# Patient Record
Sex: FEMALE | Race: WHITE
[De-identification: names, ages, dates, MRNs, and addresses within clinical notes are randomized per-mention and may not be internally consistent; named-entity substitution may affect disease eponyms.]

---

## 2017-02-05 ENCOUNTER — HOSPITAL ENCOUNTER (EMERGENCY)
Dept: HOSPITAL 17 - PHED | Age: 47
Discharge: HOME | End: 2017-02-05
Payer: COMMERCIAL

## 2017-02-05 VITALS
TEMPERATURE: 97.5 F | DIASTOLIC BLOOD PRESSURE: 87 MMHG | SYSTOLIC BLOOD PRESSURE: 133 MMHG | HEART RATE: 79 BPM | RESPIRATION RATE: 16 BRPM | OXYGEN SATURATION: 97 %

## 2017-02-05 VITALS — DIASTOLIC BLOOD PRESSURE: 86 MMHG | SYSTOLIC BLOOD PRESSURE: 132 MMHG

## 2017-02-05 VITALS — HEIGHT: 63 IN | BODY MASS INDEX: 42.7 KG/M2 | WEIGHT: 240.97 LBS

## 2017-02-05 DIAGNOSIS — F17.210: ICD-10-CM

## 2017-02-05 DIAGNOSIS — I10: ICD-10-CM

## 2017-02-05 DIAGNOSIS — L03.113: Primary | ICD-10-CM

## 2017-02-05 PROCEDURE — 96372 THER/PROPH/DIAG INJ SC/IM: CPT

## 2017-02-05 NOTE — PD
HPI


Chief Complaint:  Skin Problem


Time Seen by Provider:  09:26


Travel History


International Travel<30 days:  No


Contact w/Intl Traveler<30days:  No


Traveled to known affect area:  No





History of Present Illness


HPI


This is a 46-year-old female who presents with pain, swelling and redness of 

her right elbow, constant since yesterday morning when she woke up with the 

symptoms, worsening throughout the day and the feeling like a burning 

sensation.  She denies any associated fevers or chills.  She's never had 

anything like this before.  She thought maybe she got bit by something.  Her 

 has a history of MRSA.  She is not on any immunosuppressive medications.





PF[facility]


Past Medical History


Hx Anticoagulant Therapy:  No


Cardiovascular Problems:  Yes (HTN)


Diabetes:  No


Diminished Hearing:  No


Hypertension:  Yes


Neurologic:  Yes (transverse myleitis)


Psychiatric:  Yes (mood disorder )


Tetanus Vaccination:  > 5 Years


Influenza Vaccination:  No


Pregnant?:  Not Pregnant





Past Surgical History


Hysterectomy:  Yes





Social History


Alcohol Use:  No


Tobacco Use:  Yes (1 ppd)


Substance Use:  No





Allergies-Medications


(Allergen,Severity, Reaction):  


Coded Allergies:  


     No Known Allergies (Unverified , 2/5/17)


Reported Meds & Prescriptions





Reported Meds & Active Scripts


Active


Reported


Contrave (Naltrexone-Bupropion) 8-90 Mg Tab 1 Tab PO BID


Lisinopril 10 Mg Tab 10 Mg PO DAILY


Cardizem CD 24 HR (Diltiazem CD 24 HR) 240 Mg Caper 240 Mg PO DAILY


Prilosec (Omeprazole) 20 Mg Cap 20 Mg PO DAILY








Review of Systems


Except as stated in HPI:  all other systems reviewed are Neg





Physical Exam


Narrative


GENERAL:Well appearing, no acute distress


SKIN: 10 x 10 cm area of warmth and erythema with no induration or fluctuance 

overlying the olecranon bursa extending down into the right forearm.


HEAD: Atraumatic. Normocephalic. 


EYES: Pupils equal and round.  No injection or drainage. 


ENT:  Moist mucous membranes


NECK: Trachea midline. 


CARDIOVASCULAR: Regular rate and rhythm.  No murmur appreciated.


RESPIRATORY: Clear to auscultation. Breath sounds equal bilaterally. 


GASTROINTESTINAL: Abdomen soft, non-tender, nondistended. 


MUSCULOSKELETAL: Full painless passive range of motion of the right elbow with 

no joint effusion.  No fluctuance of the olecranon bursa but it is somewhat 

tender.


NEUROLOGICAL: Awake and alert. No obvious cranial nerve deficits.  Moving all 

extremities.


PSYCHIATRIC: Appropriate mood and affect; insight and judgment normal.





Data


Data


Last Documented VS





Vital Signs








  Date Time  Temp Pulse Resp B/P Pulse Ox O2 Delivery O2 Flow Rate FiO2


 


2/5/17 09:17 97.5 79 16 133/87 97   











[facility]


Medical Decision Making


Medical Screen Exam Complete:  Yes


Emergency Medical Condition:  Yes


Interpretation(s)


Afebrile


Differential Diagnosis


Cellulitis, olecranon bursitis, septic arthritis, sepsis


Narrative Course


This is a 46-year-old female who presents to the emergency department with 2 

days of redness and warmth of her right elbow.  She has a 10 x 10 cm area of 

cellulitis overlying the right elbow.  She does have some tenderness of the 

olecranon bursa but no fluctuance and the cellulitis extends down to her 

forearm.  I suspect this is more of a soft tissue infection then an acute 

bursitis.  The area was marked and the patient will be started on antibiotics.  

I don't think she requires labs or further workup at this time and she is 

nontoxic appearing, has full range of motion of the elbow and I don't suspect a 

septic arthritis.  I did advise her to return to the emergency department if 

she develops a fever or if her infection is worsening or spreading rapidly.  

She expressed understanding and is amenable to this plan.





Diagnosis





 Primary Impression:  


 Cellulitis of right elbow


Patient Instructions:  General Instructions





***Additional Instructions:


If you develop fever, increasing redness, warmth, or spreading of your infection

, or severe pain with moving the elbow, return to the emergency department 

immediately as you may require antibiotics through your IV.





Complete your course of antibiotics as prescribed.


***Med/Other Pt SpecificInfo:  Prescription(s) given


Scripts


Tramadol 50 Mg Tab50 Mg PO Q6H PRN (PAIN) #10 TAB


   Prov:Linh Huitron MD         2/5/17 


Clindamycin 300 Mg [id number] Mg PO TID  10 Days


   Prov:Linh Huitron MD         2/5/17


Disposition:  01 DISCHARGE HOME


Condition:  Stable








Linh Huitron MD Feb 5, 2017 09:44

## 2017-07-22 ENCOUNTER — HOSPITAL ENCOUNTER (EMERGENCY)
Dept: HOSPITAL 17 - PHEFT | Age: 47
Discharge: HOME | End: 2017-07-22
Payer: COMMERCIAL

## 2017-07-22 VITALS — BODY MASS INDEX: 41.56 KG/M2 | HEIGHT: 63 IN | WEIGHT: 234.57 LBS

## 2017-07-22 VITALS
RESPIRATION RATE: 18 BRPM | DIASTOLIC BLOOD PRESSURE: 63 MMHG | HEART RATE: 82 BPM | TEMPERATURE: 97.4 F | SYSTOLIC BLOOD PRESSURE: 134 MMHG | OXYGEN SATURATION: 99 %

## 2017-07-22 DIAGNOSIS — I10: ICD-10-CM
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
## 2017-07-22 DIAGNOSIS — S83.92XA: Primary | ICD-10-CM

## 2017-07-22 DIAGNOSIS — F17.210: ICD-10-CM
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
## 2017-07-22 PROCEDURE — 99283 EMERGENCY DEPT VISIT LOW MDM: CPT

## 2017-07-22 PROCEDURE — 73564 X-RAY EXAM KNEE 4 OR MORE: CPT

## 2017-07-22 NOTE — RADRPT
EXAM DATE/TIME:  07/22/2017 21:28 

 

[facility] COMPARISON:     

No previous studies available for comparison.

 

                     

INDICATIONS :     

Left knee pain.

                     

 

MEDICAL HISTORY :            

Transverse Myelitis, Cardiac arrest   

 

SURGICAL HISTORY :     

Hysterectomy.   Cardiac catheterization.

 

ENCOUNTER:     

Initial                                        

 

ACUITY:     

2 days      

 

PAIN SCORE:     

9/10

 

LOCATION:     

Left  knee

 

FINDINGS:     

Four view examination of the left knee demonstrates no evidence of fracture or dislocation.  Bony min
eralization is normal. Mild degenerative changes without fracture.  The suprapatellar soft tissues ha
ve a normal configuration.

 

CONCLUSION:     

Mild osteoarthritis without fracture.

 

 

 

 Saad Barboza MD on July 22, 2017 at 22:00           

Board Certified Radiologist.

 This report was verified electronically.

## 2017-09-02 ENCOUNTER — HOSPITAL ENCOUNTER (INPATIENT)
Dept: HOSPITAL 17 - PHED | Age: 47
LOS: 2 days | Discharge: HOME | DRG: 99 | End: 2017-09-04
Attending: HOSPITALIST | Admitting: HOSPITALIST
Payer: COMMERCIAL

## 2017-09-02 VITALS
HEART RATE: 83 BPM | RESPIRATION RATE: 17 BRPM | OXYGEN SATURATION: 95 % | TEMPERATURE: 97.7 F | SYSTOLIC BLOOD PRESSURE: 137 MMHG | DIASTOLIC BLOOD PRESSURE: 67 MMHG

## 2017-09-02 VITALS — WEIGHT: 247.58 LBS | BODY MASS INDEX: 43.87 KG/M2 | HEIGHT: 63 IN

## 2017-09-02 VITALS
OXYGEN SATURATION: 95 % | DIASTOLIC BLOOD PRESSURE: 57 MMHG | HEART RATE: 64 BPM | RESPIRATION RATE: 16 BRPM | SYSTOLIC BLOOD PRESSURE: 112 MMHG

## 2017-09-02 VITALS
OXYGEN SATURATION: 98 % | DIASTOLIC BLOOD PRESSURE: 82 MMHG | TEMPERATURE: 95.9 F | RESPIRATION RATE: 18 BRPM | HEART RATE: 72 BPM | SYSTOLIC BLOOD PRESSURE: 150 MMHG

## 2017-09-02 DIAGNOSIS — K58.9: ICD-10-CM

## 2017-09-02 DIAGNOSIS — I25.2: ICD-10-CM

## 2017-09-02 DIAGNOSIS — R39.15: ICD-10-CM

## 2017-09-02 DIAGNOSIS — F17.210: ICD-10-CM

## 2017-09-02 DIAGNOSIS — G37.3: Primary | ICD-10-CM

## 2017-09-02 DIAGNOSIS — I10: ICD-10-CM

## 2017-09-02 LAB
ALP SERPL-CCNC: 45 U/L (ref 45–117)
ALT SERPL-CCNC: 15 U/L (ref 10–53)
ANION GAP SERPL CALC-SCNC: 8 MEQ/L (ref 5–15)
APTT BLD: 24.3 SEC (ref 24.3–30.1)
AST SERPL-CCNC: 28 U/L (ref 15–37)
BACTERIA #/AREA URNS HPF: (no result) /HPF
BASOPHILS # BLD AUTO: 0.2 TH/MM3 (ref 0–0.2)
BASOPHILS NFR BLD: 2.3 % (ref 0–2)
BILIRUB SERPL-MCNC: 0.3 MG/DL (ref 0.2–1)
BUN SERPL-MCNC: 17 MG/DL (ref 7–18)
CHLORIDE SERPL-SCNC: 107 MEQ/L (ref 98–107)
COLOR UR: (no result)
COMMENT (UR): (no result)
CULTURE IF INDICATED: (no result)
EOSINOPHIL # BLD: 0 TH/MM3 (ref 0–0.4)
EOSINOPHIL NFR BLD: 0.1 % (ref 0–4)
ERYTHROCYTE [DISTWIDTH] IN BLOOD BY AUTOMATED COUNT: 12.8 % (ref 11.6–17.2)
GFR SERPLBLD BASED ON 1.73 SQ M-ARVRAT: 89 ML/MIN (ref 89–?)
GLUCOSE UR STRIP-MCNC: (no result) MG/DL
HCO3 BLD-SCNC: 24.3 MEQ/L (ref 21–32)
HCT VFR BLD CALC: 36.3 % (ref 35–46)
HEMO FLAGS: (no result)
HGB UR QL STRIP: (no result)
INR PPP: 0.9 RATIO
KETONES UR STRIP-MCNC: (no result) MG/DL
LABORATORY COMMENT REPORT: (no result)
LEUKOCYTE ESTERASE UR QL STRIP: (no result) /HPF (ref 0–5)
LYMPHOCYTES # BLD AUTO: 1.2 TH/MM3 (ref 1–4.8)
LYMPHOCYTES NFR BLD AUTO: 14.5 % (ref 9–44)
MAGNESIUM SERPL-MCNC: 2.1 MG/DL (ref 1.5–2.5)
MCH RBC QN AUTO: 32 PG (ref 27–34)
MCHC RBC AUTO-ENTMCNC: 33.1 % (ref 32–36)
MCV RBC AUTO: 96.6 FL (ref 80–100)
METHOD OF COLLECTION: (no result)
MONOCYTES NFR BLD: 7.5 % (ref 0–8)
NEUTROPHILS # BLD AUTO: 6.5 TH/MM3 (ref 1.8–7.7)
NEUTROPHILS NFR BLD AUTO: 75.6 % (ref 16–70)
NITRITE UR QL STRIP: (no result)
PLATELET # BLD: 195 TH/MM3 (ref 150–450)
POTASSIUM SERPL-SCNC: 4.6 MEQ/L (ref 3.5–5.1)
PROTHROMBIN TIME: 10.3 SEC (ref 9.8–11.6)
RBC # BLD AUTO: 3.76 MIL/MM3 (ref 4–5.3)
RBC #/AREA URNS HPF: (no result) /HPF (ref 0–3)
SODIUM SERPL-SCNC: 139 MEQ/L (ref 136–145)
SP GR UR STRIP: 1.01 (ref 1–1.03)
SQUAMOUS #/AREA URNS HPF: > 8 /HPF (ref 0–5)
WBC # BLD AUTO: 8.5 TH/MM3 (ref 4–11)

## 2017-09-02 PROCEDURE — 51702 INSERT TEMP BLADDER CATH: CPT

## 2017-09-02 PROCEDURE — 83735 ASSAY OF MAGNESIUM: CPT

## 2017-09-02 PROCEDURE — 81001 URINALYSIS AUTO W/SCOPE: CPT

## 2017-09-02 PROCEDURE — 85610 PROTHROMBIN TIME: CPT

## 2017-09-02 PROCEDURE — 85652 RBC SED RATE AUTOMATED: CPT

## 2017-09-02 PROCEDURE — 72156 MRI NECK SPINE W/O & W/DYE: CPT

## 2017-09-02 PROCEDURE — A9579 GAD-BASE MR CONTRAST NOS,1ML: HCPCS

## 2017-09-02 PROCEDURE — 87086 URINE CULTURE/COLONY COUNT: CPT

## 2017-09-02 PROCEDURE — 85730 THROMBOPLASTIN TIME PARTIAL: CPT

## 2017-09-02 PROCEDURE — 87077 CULTURE AEROBIC IDENTIFY: CPT

## 2017-09-02 PROCEDURE — 93005 ELECTROCARDIOGRAM TRACING: CPT

## 2017-09-02 PROCEDURE — 84443 ASSAY THYROID STIM HORMONE: CPT

## 2017-09-02 PROCEDURE — 70553 MRI BRAIN STEM W/O & W/DYE: CPT

## 2017-09-02 PROCEDURE — 87186 SC STD MICRODIL/AGAR DIL: CPT

## 2017-09-02 PROCEDURE — 85025 COMPLETE CBC W/AUTO DIFF WBC: CPT

## 2017-09-02 PROCEDURE — 80053 COMPREHEN METABOLIC PANEL: CPT

## 2017-09-02 RX ADMIN — LISINOPRIL SCH MG: 20 TABLET ORAL at 21:48

## 2017-09-02 RX ADMIN — Medication SCH ML: at 21:00

## 2017-09-02 RX ADMIN — PHENYTOIN SODIUM SCH MLS/HR: 50 INJECTION INTRAMUSCULAR; INTRAVENOUS at 21:48

## 2017-09-02 RX ADMIN — STANDARDIZED SENNA CONCENTRATE AND DOCUSATE SODIUM SCH TAB: 8.6; 5 TABLET, FILM COATED ORAL at 21:48

## 2017-09-02 NOTE — RADRPT
EXAM DATE/TIME:  09/02/2017 18:41 

 

[facility] COMPARISON:     

No previous studies available for comparison.

       

 

 

INDICATIONS :     

Left side weakness.

                     

 

CONTRAST:     

20 cc Omniscan (gadodiamide) IV

                     

 

MEDICAL HISTORY :     

Hypertension. Cardiovascular disease   

 

SURGICAL HISTORY :     

Hysterectomy.     Bladder sling

 

ENCOUNTER:     

Initial

 

ACUITY:     

1 day

 

PAIN SCORE:     

0/10

 

LOCATION:       

cranial 

 

TECHNIQUE:     

Multiplanar, multisequence MRI of the brain was performed both prior to and following the administrat
ion of paramagnetic contrast.

 

FINDINGS:     

 

CEREBRUM:     

The ventricles are normal for age.  No evidence of midline shift, mass lesion, hemorrhage or acute in
farction.  No extraaxial fluid collections are seen.  The pituitary gland and suprasellar cistern are
 normal in configuration.

 

WHITE MATTER:     

On the flair weighted images there are multiple small punctate areas of increased signal in the white
 matter.

 

POSTERIOR FOSSA:     

The cerebellum and brainstem are intact.  The 4th ventricle is midline. The cerebellopontine angle is
 unremarkable.  The cerebellar tonsils are normal in position.

 

DIFFUSION IMAGING:     

No focal areas of restricted diffusion are seen.  No evidence of acute infarction.

 

EXTRACRANIAL:     

The visualized portions of the orbits and paranasal sinuses are unremarkable.

 

POST-CONTRAST:     

No abnormal areas of parenchymal or dural enhancement.  No evidence of blood-brain barrier breakdown.


 

CONCLUSION:     

1. No acute hemorrhage, mass or infarction.

2. On the flair weighted images there are multiple small scattered punctate areas of increased signal
 which are nonspecific. These may represent chronic small vessel ischemic change. Demyelination is le
ss likely.

 

 

 

 Lopez Harding MD on September 02, 2017 at 19:51           

Board Certified Radiologist.

 This report was verified electronically.

## 2017-09-02 NOTE — PD
HPI


Chief Complaint:  General Weakness


Time Seen by Provider:  16:36


Travel History


International Travel<30 days:  No


Contact w/Intl Traveler<30days:  No


Traveled to known affect area:  No





History of Present Illness


HPI


This 47-year-old female is complaining of generalized weakness.  She says that 

about 10 years ago she had transverse myelitis.  She was admitted to the 

hospital and had a fairly good recovery.  At that time her left side was weaker 

than her right.  She was not able to walk.  Last Saturday she started having 

some electric shock sensations in her arm, especially her left arm.  She has 

noted increasing weakness of the arm and the leg.  She went to see her primary 

care doctor on Monday and was started on a Medrol Dosepak.  She was on his on 

the fifth day of the Medrol Dosepak.  She was treated in [facility] at 

that time.  Today she has had some incontinence of urine on 2 occasions.  She 

has not had incontinence before.  Her previous episode was 10 years ago in 

[place].  She was treated with a steroid drip and had a fairly prompt 

improvement.  She did not have a complete recovery and she generally walks with 

a limp.  She has a note with her from her treating the neurologist saying that 

she has a permanent lesion in the cervical spine.  She was not diagnosed with 

multiple sclerosis at that time she does say her face was effected at that time





[facility]


Past Medical History


Hx Anticoagulant Therapy:  No


Cardiac Catheterization:  Yes


Cardiovascular Problems:  Yes (heart attack)


Diabetes:  No


Diminished Hearing:  No


Hypertension:  Yes


Neurologic:  Yes (transverse myleitis)


Psychiatric:  Yes (mood disorder )


Tubal Ligation:  Yes





Past Surgical History


Genitourinary Surgery:  Yes (bladder sling)


Hysterectomy:  Yes





Social History


Alcohol Use:  No


Tobacco Use:  Yes (1 ppd)


Substance Use:  No





Allergies-Medications


(Allergen,Severity, Reaction):  


Coded Allergies:  


     No Known Allergies (Unverified , 9/2/17)


Reported Meds & Prescriptions





Reported Meds & Active Scripts


Active


Reported


Medrol Dosepak (Methylprednisolone) 4 Mg Dspk 4 Mg PO AS DIRECTED


     Per Pharmacist direction


Potassium Chloride ER (Potassium Chloride) 20 Meq Tab 20 Meq PO AS DIRECTED


     Takes PRN for edema


Lasix (Furosemide) 20 Mg Tab 20 Mg PO AS DIRECTED


     Takes PRN for edema


Voltaren-Xr (Diclofenac Sodium) 100 Mg Tab.er.24h 1 Tab PO DAILY PRN


Lyrica (Pregabalin) 200 Mg Cap 200 Mg PO TID


Omeprazole 20 Mg Tab 40 Mg PO DAILY


Lisinopril 10 Mg Tab 20 Mg PO BID


Cardizem CD 24 HR (Diltiazem CD 24 HR) 240 Mg Caper 240 Mg PO DAILY








Review of Systems


General / Constitutional:  No: Fever, Chills


Eyes:  No: Diploplia, Blurred Vision


HENT:  No: Headaches


Cardiovascular:  No: Chest Pain or Discomfort, Palpitations


Respiratory:  No: Cough, Shortness of Breath


Gastrointestinal:  No: Vomiting, Diarrhea


Genitourinary:  Positive: Urgency, Frequency, Incontinence


Musculoskeletal:  Positive: Weakness





Physical Exam


Narrative


GENERAL: Well-developed female


SKIN: Focused skin assessment warm/dry.


HEAD: Atraumatic. Normocephalic. 


EYES: Pupils equal and round. No scleral icterus. No injection or drainage. 


ENT: No nasal bleeding or discharge.  Mucous membranes pink and moist.


NECK: Trachea midline. No JVD. 


CARDIOVASCULAR: Regular rate and rhythm.  No murmur appreciated.


RESPIRATORY: No accessory muscle use. Clear to auscultation. Breath sounds 

equal bilaterally. 


GASTROINTESTINAL: Abdomen soft, non-tender, nondistended. Hepatic and splenic 

margins not palpable. 


MUSCULOSKELETAL: No obvious deformities. No clubbing.  No cyanosis.  No edema. 


NEUROLOGICAL: Awake and alert. No obvious cranial nerve deficits.  She has 4 

over 5 weakness of the arms and the legs, the left side is weaker than the 

right.  There is a left sided sensory deficit involving the trunk and the arms 

and the legs


PSYCHIATRIC: Appropriate mood and affect; insight and judgment normal.





Data


Data


Last Documented VS





Vital Signs








  Date Time  Temp Pulse Resp B/P (MAP) Pulse Ox O2 Delivery O2 Flow Rate FiO2


 


9/2/17 20:08  64 16 112/57 (75) 95 Room Air  


 


9/2/17 16:30 97.7       








Orders





 Orders


Electrocardiogram (9/2/17 16:58)


Complete Blood Count With Diff (9/2/17 16:58)


Comprehensive Metabolic Panel (9/2/17 16:58)


Prothrombin Time / Inr (Pt) (9/2/17 16:58)


Act Partial Throm Time (Ptt) (9/2/17 16:58)


Urinalysis - C+S If Indicated (9/2/17 16:58)


Westergren Sedimentation Rate (9/2/17 16:58)


Magnesium (Mg) (9/2/17 16:58)


Thyroid Stimulating Hormone (9/2/17 16:58)


Mri Brain W&W/O Contrast (9/2/17 17:00)


Mri C Spine W&W/O Contrast (9/2/17 )


Urinary Catheter Insert/Apply (9/2/17 17:10)


Phenazopyridine (Pyridium) (9/2/17 17:45)


Urine Culture (9/2/17 17:20)


Methylprednisolone So Succ Inj (Solumedr (9/2/17 20:15)





Labs





Laboratory Tests








Test


  9/2/17


17:10 9/2/17


17:20


 


White Blood Count 8.5 TH/MM3  


 


Red Blood Count 3.76 MIL/MM3  


 


Hemoglobin 12.0 GM/DL  


 


Hematocrit 36.3 %  


 


Mean Corpuscular Volume 96.6 FL  


 


Mean Corpuscular Hemoglobin 32.0 PG  


 


Mean Corpuscular Hemoglobin


Concent 33.1 % 


  


 


 


Red Cell Distribution Width 12.8 %  


 


Platelet Count 195 TH/MM3  


 


Mean Platelet Volume 10.1 FL  


 


Neutrophils (%) (Auto) 75.6 %  


 


Lymphocytes (%) (Auto) 14.5 %  


 


Monocytes (%) (Auto) 7.5 %  


 


Eosinophils (%) (Auto) 0.1 %  


 


Basophils (%) (Auto) 2.3 %  


 


Neutrophils # (Auto) 6.5 TH/MM3  


 


Lymphocytes # (Auto) 1.2 TH/MM3  


 


Monocytes # (Auto) 0.6 TH/MM3  


 


Eosinophils # (Auto) 0.0 TH/MM3  


 


Basophils # (Auto) 0.2 TH/MM3  


 


CBC Comment DIFF FINAL  


 


Differential Comment   


 


Erythrocyte Sedimentation Rate 2 mm/hr  


 


Prothrombin Time 10.3 SEC  


 


Prothromb Time International


Ratio 0.9 RATIO 


  


 


 


Activated Partial


Thromboplast Time 24.3 SEC 


  


 


 


Blood Urea Nitrogen 17 MG/DL  


 


Creatinine 0.70 MG/DL  


 


Random Glucose 88 MG/DL  


 


Total Protein 6.5 GM/DL  


 


Albumin 3.3 GM/DL  


 


Calcium Level 8.4 MG/DL  


 


Magnesium Level 2.1 MG/DL  


 


Alkaline Phosphatase 45 U/L  


 


Aspartate Amino Transf


(AST/SGOT) 28 U/L 


  


 


 


Alanine Aminotransferase


(ALT/SGPT) 15 U/L 


  


 


 


Total Bilirubin 0.3 MG/DL  


 


Sodium Level 139 MEQ/L  


 


Potassium Level 4.6 MEQ/L  


 


Chloride Level 107 MEQ/L  


 


Carbon Dioxide Level 24.3 MEQ/L  


 


Anion Gap 8 MEQ/L  


 


Estimat Glomerular Filtration


Rate 89 ML/MIN 


  


 


 


Thyroid Stimulating Hormone


3rd Gen 1.990 uIU/ML 


  


 


 


Urine Collection Type  CLEAN CATCH 


 


Urine Color  STRAW 


 


Urine Turbidity  SLIGHT 


 


Urine pH  6.5 


 


Urine Specific Gravity  1.013 


 


Urine Protein  NEG mg/dL 


 


Urine Glucose (UA)  NEG mg/dL 


 


Urine Ketones  NEG mg/dL 


 


Urine Occult Blood  SMALL 


 


Urine Nitrite  NEG 


 


Urine Bilirubin  NEG 


 


Urine Leukocyte Esterase  NEG 


 


Urine RBC  4-9 /hpf 


 


Urine WBC  0-2 /hpf 


 


Urine Squamous Epithelial


Cells 


  > 8 /hpf 


 


 


Urine Amorphous Sediment  FEW 


 


Urine Bacteria  MOD /hpf 


 


Microscopic Urinalysis Comment


  


  CULTURE


INDICATED


 


Urine Collection Time  1720 











[facility]


Medical Decision Making


Medical Screen Exam Complete:  Yes


Emergency Medical Condition:  Yes


Medical Record Reviewed:  Yes


Differential Diagnosis


Differential includes transverse myelitis, CVA, MS


Narrative Course


MRI of the brain and spinal cord were obtained.  On the MRI of the brain there 

is no acute hemorrhage or mass or infarction.  There are small scattered 

punctate areas of increased signal on the flare weighted images area isn't 

thought to be nonspecific.  On the cervical spine there is some motion 

artifact.  The cord is intact with no abnormal enhancement or focal lesion.  

Case discussed with Dr. Ashley and we will initiate high-dose steroids





Diagnosis





 Primary Impression:  


 Acute weakness





Admitting Information


Admitting Physician Requests:  Admit











Carlos Arelalno MD Sep 2, 2017 16:58

## 2017-09-02 NOTE — RADRPT
EXAM DATE/TIME:  09/02/2017 18:41 

 

[facility] COMPARISON:     

No previous studies available for comparison.

       

 

 

INDICATIONS :     

Extremity weakness. Transverse myelitis, left side weakness.

                     

 

CONTRAST:     

20 cc Omniscan (gadodiamide) IV

                     

 

MEDICAL HISTORY :     

Cardiovascular disease Hypertension.   

 

SURGICAL HISTORY :     

Hysterectomy.     Bladder sling

 

ENCOUNTER:     

Initial

 

ACUITY:     

1 day

 

PAIN SCORE:     

0/10

 

LOCATION:       

neck 

 

TECHNIQUE:     

Multiplanar, multisequence MRI examination of the cervical spine was performed.

 

FINDINGS:      

The study is diffusely degraded by motion artifact.     

 

VERTEBRAE:     

Normal vertebral body height.  Homogeneous marrow signal.

 

ALIGNMENT:     

No evidence of subluxation.

 

CORD:     

Normal configuration and signal.

 

POST FOSSA:     

The cerebellar tonsils are normal in position.

 

POST-CONTRAST:     

No abnormal areas of enhancement are seen.

 

C2-C3:  

The  thecal sac has a normal configuration.  There is no evidence of disc herniation or spinal canal 
stenosis.  The neural foramina are patent bilaterally.

 

C3-C4:  

The thecal sac has a normal configuration.  There is no evidence of disc herniation or spinal canal s
tenosis.  The neural foramina are patent bilaterally.

 

C4-C5:  

The thecal sac has a normal configuration.  There is no evidence of disc herniation or spinal canal s
tenosis.  The neural foramina are patent bilaterally.

 

C5-C6:   

There is an annular disc bulge with mass effect on the anterior thecal sac. There is no evidence of d
isc herniation or spinal canal stenosis.  The neural foramina are patent bilaterally.

 

C6-C7: 

There is an annular disc bulge with mass effect on the anterior thecal sac..  There is no evidence of
 disc herniation or spinal canal stenosis.  The neural foramina are patent bilaterally.

 

C7-T1:  

The thecal sac has a normal configuration.  There is no evidence of disc herniation or spinal canal s
tenosis.  The neural foramina are patent bilaterally.

 

CONCLUSION:     

1. Suboptimal exam which is diffusely degraded by motion artifact.

2. The cervical cord is intact in appearance with no abnormal enhancement or focal lesion identified.


3. Annular disc bulges at C5-6 and C6-7 with mass effect on the thecal sac and no definite mass effec
t on the cord. 

 

 

 Lopez Harding MD on September 02, 2017 at 19:39           

Board Certified Radiologist.

 This report was verified electronically.

## 2017-09-03 VITALS
DIASTOLIC BLOOD PRESSURE: 78 MMHG | RESPIRATION RATE: 20 BRPM | TEMPERATURE: 97.8 F | HEART RATE: 75 BPM | SYSTOLIC BLOOD PRESSURE: 129 MMHG | OXYGEN SATURATION: 96 %

## 2017-09-03 VITALS
DIASTOLIC BLOOD PRESSURE: 63 MMHG | TEMPERATURE: 99.3 F | OXYGEN SATURATION: 92 % | SYSTOLIC BLOOD PRESSURE: 133 MMHG | RESPIRATION RATE: 16 BRPM | HEART RATE: 88 BPM

## 2017-09-03 VITALS
SYSTOLIC BLOOD PRESSURE: 139 MMHG | TEMPERATURE: 97.6 F | OXYGEN SATURATION: 93 % | HEART RATE: 61 BPM | DIASTOLIC BLOOD PRESSURE: 86 MMHG | RESPIRATION RATE: 20 BRPM

## 2017-09-03 VITALS
SYSTOLIC BLOOD PRESSURE: 129 MMHG | DIASTOLIC BLOOD PRESSURE: 75 MMHG | RESPIRATION RATE: 16 BRPM | TEMPERATURE: 96.4 F | HEART RATE: 76 BPM | OXYGEN SATURATION: 93 %

## 2017-09-03 VITALS
HEART RATE: 77 BPM | TEMPERATURE: 97.7 F | OXYGEN SATURATION: 94 % | SYSTOLIC BLOOD PRESSURE: 131 MMHG | RESPIRATION RATE: 20 BRPM | DIASTOLIC BLOOD PRESSURE: 74 MMHG

## 2017-09-03 LAB
ALP SERPL-CCNC: 43 U/L (ref 45–117)
ALT SERPL-CCNC: 14 U/L (ref 10–53)
ANION GAP SERPL CALC-SCNC: 8 MEQ/L (ref 5–15)
AST SERPL-CCNC: 6 U/L (ref 15–37)
BASOPHILS # BLD AUTO: 0 TH/MM3 (ref 0–0.2)
BASOPHILS NFR BLD: 0.6 % (ref 0–2)
BILIRUB SERPL-MCNC: 0.3 MG/DL (ref 0.2–1)
BUN SERPL-MCNC: 16 MG/DL (ref 7–18)
CHLORIDE SERPL-SCNC: 105 MEQ/L (ref 98–107)
EOSINOPHIL # BLD: 0 TH/MM3 (ref 0–0.4)
EOSINOPHIL NFR BLD: 0.1 % (ref 0–4)
ERYTHROCYTE [DISTWIDTH] IN BLOOD BY AUTOMATED COUNT: 12.7 % (ref 11.6–17.2)
GFR SERPLBLD BASED ON 1.73 SQ M-ARVRAT: 88 ML/MIN (ref 89–?)
HCO3 BLD-SCNC: 25.8 MEQ/L (ref 21–32)
HCT VFR BLD CALC: 37.2 % (ref 35–46)
HEMO FLAGS: (no result)
LYMPHOCYTES # BLD AUTO: 0.9 TH/MM3 (ref 1–4.8)
LYMPHOCYTES NFR BLD AUTO: 15.4 % (ref 9–44)
MCH RBC QN AUTO: 31.4 PG (ref 27–34)
MCHC RBC AUTO-ENTMCNC: 32.5 % (ref 32–36)
MCV RBC AUTO: 96.5 FL (ref 80–100)
MONOCYTES NFR BLD: 1.1 % (ref 0–8)
NEUTROPHILS # BLD AUTO: 4.8 TH/MM3 (ref 1.8–7.7)
NEUTROPHILS NFR BLD AUTO: 82.8 % (ref 16–70)
PLATELET # BLD: 171 TH/MM3 (ref 150–450)
POTASSIUM SERPL-SCNC: 3.9 MEQ/L (ref 3.5–5.1)
RBC # BLD AUTO: 3.86 MIL/MM3 (ref 4–5.3)
SODIUM SERPL-SCNC: 139 MEQ/L (ref 136–145)
WBC # BLD AUTO: 5.8 TH/MM3 (ref 4–11)

## 2017-09-03 RX ADMIN — Medication SCH ML: at 08:10

## 2017-09-03 RX ADMIN — Medication SCH ML: at 19:58

## 2017-09-03 RX ADMIN — STANDARDIZED SENNA CONCENTRATE AND DOCUSATE SODIUM SCH TAB: 8.6; 5 TABLET, FILM COATED ORAL at 19:59

## 2017-09-03 RX ADMIN — LISINOPRIL SCH MG: 20 TABLET ORAL at 08:09

## 2017-09-03 RX ADMIN — HYDROCODONE BITARTRATE AND ACETAMINOPHEN PRN TAB: 5; 325 TABLET ORAL at 02:42

## 2017-09-03 RX ADMIN — SODIUM CHLORIDE SCH MLS/HR: 900 INJECTION INTRAVENOUS at 13:02

## 2017-09-03 RX ADMIN — FUROSEMIDE SCH MG: 20 TABLET ORAL at 08:10

## 2017-09-03 RX ADMIN — PANTOPRAZOLE SCH MG: 40 TABLET, DELAYED RELEASE ORAL at 08:09

## 2017-09-03 RX ADMIN — PREGABALIN SCH MG: 100 CAPSULE ORAL at 13:02

## 2017-09-03 RX ADMIN — PREGABALIN SCH MG: 100 CAPSULE ORAL at 08:10

## 2017-09-03 RX ADMIN — PHENYTOIN SODIUM SCH MLS/HR: 50 INJECTION INTRAMUSCULAR; INTRAVENOUS at 19:58

## 2017-09-03 RX ADMIN — PHENYTOIN SODIUM SCH MLS/HR: 50 INJECTION INTRAMUSCULAR; INTRAVENOUS at 08:07

## 2017-09-03 RX ADMIN — DILTIAZEM HYDROCHLORIDE SCH MG: 240 CAPSULE, EXTENDED RELEASE ORAL at 08:09

## 2017-09-03 RX ADMIN — PREGABALIN SCH MG: 100 CAPSULE ORAL at 17:48

## 2017-09-03 RX ADMIN — MORPHINE SULFATE PRN MG: 2 INJECTION, SOLUTION INTRAMUSCULAR; INTRAVENOUS at 20:00

## 2017-09-03 RX ADMIN — HYDROCODONE BITARTRATE AND ACETAMINOPHEN PRN TAB: 5; 325 TABLET ORAL at 08:09

## 2017-09-03 RX ADMIN — METHYLPREDNISOLONE SODIUM SUCCINATE SCH MLS/HR: 1 INJECTION, POWDER, FOR SOLUTION INTRAMUSCULAR; INTRAVENOUS at 09:35

## 2017-09-03 RX ADMIN — STANDARDIZED SENNA CONCENTRATE AND DOCUSATE SODIUM SCH TAB: 8.6; 5 TABLET, FILM COATED ORAL at 08:09

## 2017-09-03 RX ADMIN — HYDROCODONE BITARTRATE AND ACETAMINOPHEN PRN TAB: 5; 325 TABLET ORAL at 17:51

## 2017-09-03 RX ADMIN — LISINOPRIL SCH MG: 20 TABLET ORAL at 19:59

## 2017-09-03 RX ADMIN — METHYLPREDNISOLONE SODIUM SUCCINATE SCH MLS/HR: 1 INJECTION, POWDER, FOR SOLUTION INTRAMUSCULAR; INTRAVENOUS at 19:58

## 2017-09-03 NOTE — HHI.HP
__________________________________________________





HPI


Service


[facility]


Primary Care Physician


Non-Staff


Admission Diagnosis





ACUTE WEAKNESS, POSS MYELOPATHY


Diagnoses:  


(1) Neurological complaint


Diagnosis:  Principal





(2) Hypertension


Diagnosis:  Secondary





Chief Complaint:  


Paresthesia, weakness.


Travel History


International Travel<30 Days:  No


Contact w/Intl Traveler <30 Da:  No


Traveled to Known Affected Are:  No


History of Present Illness


Written by Oswald Quinones, acting as scribe for Dr. Purcell on 9/3/17 at 08:

32. 





47-year-old  female with known history of hypertension, chronic smoker

, irritable bowel syndrome, history of transverse myelitis who presented to 

hospital because of one week history of neurological symptoms.  She states that 

a week ago she started developing paresthesia and achy sensation in her left arm

, leg heaviness whenever she ambulated.  She went to her primary medical doctor 

and was given a Medrol Dosepak, she is on her fifth day and she did not have 

any significant improvement.  She started developing some urinary urgency with 

which he described is sensation of not completely emptying her bladder.  She 

states that she has transverse myelitis in her last acute episode was 10 years 

ago.  She indicates that her symptoms she is experiencing now is the same as 

she had first diagnosis of her condition.  Patient has received IV steroids and 

she states that she is feeling much better.  Symptoms are almost completely 

resolved.  Patient denies any headache, visual disturbances, dysphagia, she has 

chronic ambulation issues with walking with the lab, however no acute 

unilateral numbness or disequilibrium.  Denies any loss of bowel control.  

Patient had workup done emergency department, ER physician did speak with 

neurologist on-call who recommended IV steroids.





Review of Systems


Genitourinary:  COMPLAINS OF: Urinary incontinence, Urgency


Neurologic:  COMPLAINS OF: Abnormal gait, Localized weakness, Paresthesias


Except as stated in HPI:  all other systems reviewed are Neg





Past Family Social History


Past Medical History


Hypertension


History of transverse myelitis


Chronic smoker,


irritable bowel syndrome


History of myocardial infarction


Past Surgical History


Cardiac catheterization with normal findings


Bladder sling


Tubal ligation


Hysterectomy


Allergies:  


Coded Allergies:  


     No Known Allergies (Unverified , 17)


Family History


Father alive is 70 years old with history of prostate cancer, mother alive at 

age 68 with lupus, diabetes, thyroid


Social History


Patient continues to smoke one pack a cigarettes a day since she was 16 years 

old.  She denies any alcohol or illicit drugs





Physical Exam


Vital Signs





Vital Signs








  Date Time  Temp Pulse Resp B/P (MAP) Pulse Ox O2 Delivery O2 Flow Rate FiO2


 


9/3/17 04:56        


 


9/3/17 00:57 96.4 76 16 129/75 (93) 93   


 


17 21:23 95.9 72 18 150/82 (104) 98   


 


17 21:10  62 18  95   


 


17 20:08  64 16 112/57 (75) 95 Room Air  


 


17 16:30 97.7 83 17 137/67 (90) 95   








Physical Exam


GENERAL: Well-developed, well-nourished, in no acute distress. alert and 

orientated


HEENT: Head is normocephalic without any lesions or masses noted. Facial 

features are symmetric. Eyes: Pupils equal round reactive to light. Extraocular 

muscles are intact. Conjunctivae were clear. Oropharyngeal: Pharynx without any 

erythema edema. Tongue is midline without deviation. Buccal mucosa is moist 

without any masses or lesions


NECK: Supple without any masses. Trachea midline no deviation. No JVD, no 

bruits are appreciated


CARDIAC: Regular rhythm, regular rate. S1/S2 are heard. No murmurs gallops or 

rubs.


LUNGS: Clear to auscultation bilaterally. No wheeze, rhonchi or rales. No use 

of accessory muscles on inspiration or expiration.


ABDOMEN: Soft, nontender. Nondistended. Bowel sounds heard in all 4 quadrants. 

No organomegaly or masses. Negative rebound, negative guarding


EXTREMITIES: No edema, pulses are equal bilaterally. No cyanosis or clubbing


NEUROLOGY: Mood and affect appear appropriate. Cranial nerves II through XII 

grossly intact. Muscle strength 5/5 in upper and lower extremities bilaterally. 

Deep tendon reflexes are 2+ in upper and lower extremities bilaterally.


Laboratory





Laboratory Tests








Test


  17


17:10 17


17:20 9/3/17


06:20


 


White Blood Count 8.5   5.8 


 


Red Blood Count 3.76   3.86 


 


Hemoglobin 12.0   12.1 


 


Hematocrit 36.3   37.2 


 


Mean Corpuscular Volume 96.6   96.5 


 


Mean Corpuscular Hemoglobin 32.0   31.4 


 


Mean Corpuscular Hemoglobin


Concent 33.1 


  


  32.5 


 


 


Red Cell Distribution Width 12.8   12.7 


 


Platelet Count 195   171 


 


Mean Platelet Volume 10.1   10.1 


 


Neutrophils (%) (Auto) 75.6   82.8 


 


Lymphocytes (%) (Auto) 14.5   15.4 


 


Monocytes (%) (Auto) 7.5   1.1 


 


Eosinophils (%) (Auto) 0.1   0.1 


 


Basophils (%) (Auto) 2.3   0.6 


 


Neutrophils # (Auto) 6.5   4.8 


 


Lymphocytes # (Auto) 1.2   0.9 


 


Monocytes # (Auto) 0.6   0.1 


 


Eosinophils # (Auto) 0.0   0.0 


 


Basophils # (Auto) 0.2   0.0 


 


CBC Comment DIFF FINAL   DIFF FINAL 


 


Differential Comment     


 


Erythrocyte Sedimentation Rate 2   


 


Prothrombin Time 10.3   


 


Prothromb Time International


Ratio 0.9 


  


  


 


 


Activated Partial


Thromboplast Time 24.3 


  


  


 


 


Blood Urea Nitrogen 17   16 


 


Creatinine 0.70   0.71 


 


Random Glucose 88   111 


 


Total Protein 6.5   6.2 


 


Albumin 3.3   3.2 


 


Calcium Level 8.4   8.7 


 


Magnesium Level 2.1   


 


Alkaline Phosphatase 45   43 


 


Aspartate Amino Transf


(AST/SGOT) 28 


  


  6 


 


 


Alanine Aminotransferase


(ALT/SGPT) 15 


  


  14 


 


 


Total Bilirubin 0.3   0.3 


 


Sodium Level 139   139 


 


Potassium Level 4.6   3.9 


 


Chloride Level 107   105 


 


Carbon Dioxide Level 24.3   25.8 


 


Anion Gap 8   8 


 


Estimat Glomerular Filtration


Rate 89 


  


  88 


 


 


Thyroid Stimulating Hormone


3rd Gen 1.990 


  


  


 


 


Urine Collection Type  CLEAN CATCH  


 


Urine Color  STRAW  


 


Urine Turbidity  SLIGHT  


 


Urine pH  6.5  


 


Urine Specific Gravity  1.013  


 


Urine Protein  NEG  


 


Urine Glucose (UA)  NEG  


 


Urine Ketones  NEG  


 


Urine Occult Blood  SMALL  


 


Urine Nitrite  NEG  


 


Urine Bilirubin  NEG  


 


Urine Leukocyte Esterase  NEG  


 


Urine RBC  4-9  


 


Urine WBC  0-2  


 


Urine Squamous Epithelial


Cells 


  > 8 


  


 


 


Urine Amorphous Sediment  FEW  


 


Urine Bacteria  MOD  


 


Microscopic Urinalysis Comment


  


  CULTURE


INDICATED 


 


 


Urine Collection Time  1720  














 Date/Time


Source Procedure


Growth Status


 


 


 17 17:20


Urine Clean Catch Urine Culture


Pending Received








Result Diagram:  


9/3/17 0620                                                                    

            9/3/17 0620





Imaging





Last Impressions








Brain MRI 17 1700 Signed





Impressions: 





 Service Date/Time:  2017 18:41 - CONCLUSION:  1. No 

acute 





 hemorrhage, mass or infarction. 2. On the flair weighted images there are 





 multiple small scattered punctate areas of increased signal which are 





 nonspecific. These may represent chronic small vessel ischemic change. 





 Demyelination is less likely.     Lopez Harding MD 


 


Cervical Spine MRI 17 0000 Signed





Impressions: 





 Service Date/Time:  2017 18:41 - CONCLUSION:  1. 





 Suboptimal exam which is diffusely degraded by motion artifact. 2. The 

cervical 





 cord is intact in appearance with no abnormal enhancement or focal lesion 





 identified. 3. Annular disc bulges at C5-6 and C6-7 with mass effect on the 





 thecal sac and no definite mass effect on the cord.     Lopez Harding MD 











Caprini VTE Risk Assessment


Caprini VTE Risk Assessment:  No/Low Risk (score <= 1)


Caprini Risk Assessment Model











 Point Value = 1          Point Value = 2  Point Value = 3  Point Value = 5


 


Age 41-60


Minor surgery


BMI > 25 kg/m2


Swollen legs


Varicose veins


Pregnancy or postpartum


History of unexplained or recurrent


   spontaneous 


Oral contraceptives or hormone


   replacement


Sepsis (< 1 month)


Serious lung disease, including


   pneumonia (< 1 month)


Abnormal pulmonary function


Acute myocardial infarction


Congestive heart failure (< 1 month)


History of inflammatory bowel disease


Medical patient at bed rest Age 61-74


Arthroscopic surgery


Major open surgery (> 45 min)


Laparoscopic surgery (> 45 min)


Malignancy


Confined to bed (> 72 hours)


Immobilizing plaster cast


Central venous access Age >= 75


History of VTE


Family history of VTE


Factor V Leiden


Prothrombin 42512Z


Lupus anticoagulant


Anticardiolipin antibodies


Elevated serum homocysteine


Heparin-induced thrombocytopenia


Other congenital or acquired


   thrombophilia Stroke (< 1 month)


Elective arthroplasty


Hip, pelvis, or leg fracture


Acute spinal cord injury (< 1 month)








Prophylaxis Regimen











   Total Risk


Factor Score Risk Level Prophylaxis Regimen


 


0-1      Low Early ambulation


 


2 Moderate Order ONE of the following:


*Sequential Compression Device (SCD)


*Heparin 5000 units SQ BID


 


3-4 Higher Order ONE of the following medications:


*Heparin 5000 units SQ TID


*Enoxaparin/Lovenox 40 mg SQ daily (WT < 150 kg, CrCl > 30 mL/min)


*Enoxaparin/Lovenox 30 mg SQ daily (WT < 150 kg, CrCl > 10-29 mL/min)


*Enoxaparin/Lovenox 30 mg SQ BID (WT < 150 kg, CrCl > 30 mL/min)


AND/OR


*Sequential Compression Device (SCD)


 


5 or more Highest Order ONE of the following medications:


*Heparin 5000 units SQ TID (Preferred with Epidurals)


*Enoxaparin/Lovenox 40 mg SQ daily (WT < 150 kg, CrCl > 30 mL/min)


*Enoxaparin/Lovenox 30 mg SQ daily (WT < 150 kg, CrCl > 10-29 mL/min)


*Enoxaparin/Lovenox 30 mg SQ BID (WT < 150 kg, CrCl > 30 mL/min)


AND


*Sequential Compression Device (SCD)











Assessment and Plan


Problem List:  


(1) Neurological complaint


ICD Code:  R29.90 - Unspecified symptoms and signs involving the nervous system


Plan:  Patient with known history of transverse myelitis, presented with 

multiple symptoms to include left arm paresthesia, weakness, lower extremity 

leg heaviness, urinary incontinence.  Patient indicates these are same symptoms 

that she experienced 10 years ago when she had her first episode of transverse 

myelitis.  Patient had MRI performed which did not indicate any acute 

neurological etiology.  Patient has been given steroid infusion 1 with 

significant improvement of her symptoms.  Neurology has been consulted for 

further recommendations.  Further plans per neurology





(2) Hypertension


ICD Code:  I10 - Essential (primary) hypertension


Plan:  Blood pressure stable this time.  Home medications have been continued





Assessment and Plan


DVT prevention: Sequential compression devices





Physician Certification


2 Midnight Certification Type:  Admission for Inpatient Services


Order for Inpatient Services


The services are ordered in accordance with Medicare regulations or non-

Medicare payer requirements, as applicable.  In the case of services not 

specified as inpatient-only, they are appropriately provided as inpatient 

services in accordance with the 2-midnight benchmark.


Estimated LOS (days):  2


 days is the estimated time the patient will need to remain in the hospital, 

assuming treatment plan goals are met and no additional complications.


Post-Hospital Plan:  Not yet determined





Medical Decision Making


Impression and Plan


This note was transcribed by nella campoverde I, Dr. Gissel Purcell personally 

performed the history, physical exam, and medical decision making; and 

confirmed the accuracy of the information in the transcribed note.





Authenticated by Dr. Gissel Purcell on 9/3/17 at 08:44.





Problem Qualifiers





(1) Hypertension:  


Qualified Codes:  I10 - Essential (primary) hypertension








Oswald Quinones Sep 3, 2017 08:42


Gissel Purcell MD Sep 3, 2017 08:44

## 2017-09-03 NOTE — EKG
Date Performed: 09/02/2017       Time Performed: 17:13:20

 

PTAGE:      47 years

 

EKG:      Sinus rhythm 

 

 NORMAL ECG 

 

NO PREVIOUS TRACING            

 

DOCTOR:   Jared Olguin  Interpretating Date/Time  09/03/2017 09:21:00

## 2017-09-04 VITALS
RESPIRATION RATE: 19 BRPM | DIASTOLIC BLOOD PRESSURE: 76 MMHG | HEART RATE: 75 BPM | TEMPERATURE: 97.6 F | OXYGEN SATURATION: 94 % | SYSTOLIC BLOOD PRESSURE: 121 MMHG

## 2017-09-04 VITALS
SYSTOLIC BLOOD PRESSURE: 122 MMHG | DIASTOLIC BLOOD PRESSURE: 62 MMHG | TEMPERATURE: 98.1 F | OXYGEN SATURATION: 90 % | RESPIRATION RATE: 18 BRPM | HEART RATE: 81 BPM

## 2017-09-04 VITALS — RESPIRATION RATE: 18 BRPM

## 2017-09-04 RX ADMIN — PREGABALIN SCH MG: 100 CAPSULE ORAL at 12:13

## 2017-09-04 RX ADMIN — SODIUM CHLORIDE SCH MLS/HR: 900 INJECTION INTRAVENOUS at 11:35

## 2017-09-04 RX ADMIN — DILTIAZEM HYDROCHLORIDE SCH MG: 240 CAPSULE, EXTENDED RELEASE ORAL at 09:01

## 2017-09-04 RX ADMIN — PHENYTOIN SODIUM SCH MLS/HR: 50 INJECTION INTRAMUSCULAR; INTRAVENOUS at 06:42

## 2017-09-04 RX ADMIN — STANDARDIZED SENNA CONCENTRATE AND DOCUSATE SODIUM SCH TAB: 8.6; 5 TABLET, FILM COATED ORAL at 09:02

## 2017-09-04 RX ADMIN — PREGABALIN SCH MG: 100 CAPSULE ORAL at 09:00

## 2017-09-04 RX ADMIN — FUROSEMIDE SCH MG: 20 TABLET ORAL at 09:01

## 2017-09-04 RX ADMIN — LISINOPRIL SCH MG: 20 TABLET ORAL at 09:02

## 2017-09-04 RX ADMIN — MORPHINE SULFATE PRN MG: 2 INJECTION, SOLUTION INTRAMUSCULAR; INTRAVENOUS at 09:05

## 2017-09-04 RX ADMIN — PANTOPRAZOLE SCH MG: 40 TABLET, DELAYED RELEASE ORAL at 09:02

## 2017-09-04 RX ADMIN — MORPHINE SULFATE PRN MG: 2 INJECTION, SOLUTION INTRAMUSCULAR; INTRAVENOUS at 06:43

## 2017-09-04 RX ADMIN — METHYLPREDNISOLONE SODIUM SUCCINATE SCH MLS/HR: 1 INJECTION, POWDER, FOR SOLUTION INTRAMUSCULAR; INTRAVENOUS at 09:02

## 2017-09-04 RX ADMIN — Medication SCH ML: at 09:00

## 2017-09-04 NOTE — HHI.DCPOC
Discharge Care Plan


Diagnosis:  


(1) Acute weakness


Goals to Promote Your Health


* To prevent worsening of your condition and complications


* To maintain your health at the optimal level


Directions to Meet Your Goals


*** Take your medications as prescribed


*** Follow your dietary instruction


*** Follow activity as directed








*** Keep your appointments as scheduled


*** Take your immunizations and boosters as scheduled


*** If your symptoms worsen call your PCP, if no PCP go to Urgent Care Center 

or Emergency Room***


*** Smoking is Dangerous to Your Health. Avoid second hand smoke***


***Call the 24-hour hour crisis hotline for domestic abuse at 1-214.968.7222***











Gissel Purcell MD Sep 4, 2017 10:47

## 2017-09-04 NOTE — HHI.DS
cc:   Dorcas Ashley MD


__________________________________________________





Discharge Summary


Admission Date


Sep 2, 2017 at 20:17


Discharge Date:  Sep 4, 2017


Admitting Diagnosis





ACUTE WEAKNESS, POSS MYELOPATHY





(1) Neurological complaint


ICD Code:  R29.90 - Unspecified symptoms and signs involving the nervous system


(2) Hypertension


ICD Code:  I10 - Essential (primary) hypertension


Procedures


none


Brief History - From Admission


Written by Oswald Quinones, acting as scribe for Dr. Purcell on 9/3/17 at 08:

32. 





47-year-old  female with known history of hypertension, chronic smoker

, irritable bowel syndrome, history of transverse myelitis who presented to 

hospital because of one week history of neurological symptoms.  She states that 

a week ago she started developing paresthesia and achy sensation in her left arm

, leg heaviness whenever she ambulated.  She went to her primary medical doctor 

and was given a Medrol Dosepak, she is on her fifth day and she did not have 

any significant improvement.  She started developing some urinary urgency with 

which he described is sensation of not completely emptying her bladder.  She 

states that she has transverse myelitis in her last acute episode was 10 years 

ago.  She indicates that her symptoms she is experiencing now is the same as 

she had first diagnosis of her condition.  Patient has received IV steroids and 

she states that she is feeling much better.  Symptoms are almost completely 

resolved.  Patient denies any headache, visual disturbances, dysphagia, she has 

chronic ambulation issues with walking with the lab, however no acute 

unilateral numbness or disequilibrium.  Denies any loss of bowel control.  

Patient had workup done emergency department, ER physician did speak with 

neurologist on-call who recommended IV steroids.


CBC/BMP:  


9/3/17 0620                                                                    

            9/3/17 0620





Significant Findings





Laboratory Tests








Test


  9/2/17


17:10 9/2/17


17:20 9/3/17


06:20


 


Red Blood Count


  3.76 MIL/MM3


(4.00-5.30) 


  3.86 MIL/MM3


(4.00-5.30)


 


Neutrophils (%) (Auto)


  75.6 %


(16.0-70.0) 


  82.8 %


(16.0-70.0)


 


Basophils (%) (Auto)


  2.3 %


(0.0-2.0) 


  


 


 


Albumin


  3.3 GM/DL


(3.4-5.0) 


  3.2 GM/DL


(3.4-5.0)


 


Calcium Level


  8.4 MG/DL


(8.5-10.1) 


  


 


 


Urine Occult Blood  SMALL (NEG)  


 


Urine RBC  4-9 /hpf (0-3)  


 


Urine Squamous Epithelial


Cells 


  > 8 /hpf (0-5) 


  


 


 


Urine Bacteria


  


  MOD /hpf


(NONE) 


 


 


Lymphocytes # (Auto)


  


  


  0.9 TH/MM3


(1.0-4.8)


 


Random Glucose


  


  


  111 MG/DL


()


 


Total Protein


  


  


  6.2 GM/DL


(6.4-8.2)


 


Alkaline Phosphatase


  


  


  43 U/L


()


 


Aspartate Amino Transf


(AST/SGOT) 


  


  6 U/L (15-37) 


 


 


Estimat Glomerular Filtration


Rate 


  


  88 ML/MIN


(>89)








Imaging





Last Impressions








Brain MRI 9/2/17 1700 Signed





Impressions: 





 Service Date/Time:  Saturday, September 2, 2017 18:41 - CONCLUSION:  1. No 

acute 





 hemorrhage, mass or infarction. 2. On the flair weighted images there are 





 multiple small scattered punctate areas of increased signal which are 





 nonspecific. These may represent chronic small vessel ischemic change. 





 Demyelination is less likely.     Lopez Harding MD 


 


Cervical Spine MRI 9/2/17 0000 Signed





Impressions: 





 Service Date/Time:  Saturday, September 2, 2017 18:41 - CONCLUSION:  1. 





 Suboptimal exam which is diffusely degraded by motion artifact. 2. The 

cervical 





 cord is intact in appearance with no abnormal enhancement or focal lesion 





 identified. 3. Annular disc bulges at C5-6 and C6-7 with mass effect on the 





 thecal sac and no definite mass effect on the cord.     Lopez Harding MD 








PE at Discharge


GENERAL: This is a well-nourished, well-developed patient, in no apparent 

distress.


CARDIOVASCULAR: Regular rate and rhythm without murmurs, gallops, or rubs. 


RESPIRATORY: Clear to auscultation. Breath sounds equal bilaterally. No wheezes

, rales, or rhonchi.  


GASTROINTESTINAL: Abdomen soft, non-tender, nondistended. Normal active bowel 

sounds


MUSCULOSKELETAL: Extremities without clubbing, cyanosis, or edema.


NEURO:  Alert & Oriented x4 to person, place, time, situation.  Moves all ext x4


Pt update on day of discharge


Patient seen today in follow-up for weakness related to her previous diagnosis 

of transverse myelitis.  Patient says her pain and discomfort with numbness and 

tingling is resolved.  She is tolerating steroids with minimal side effects 

than the headache.  She does feel she is at her baseline and has requested to 

go home.  Discharge plans discussed with patient and spouse


Hospital Course


Patient is a 47-year-old female with a known history of transverse myelitis.  

She has similar symptoms and came to the emergency room for further evaluation.

  She required IV steroids and improved her symptoms quite dramatically.  

Patient was seen by neurology.  Images were unremarkable for any stroke or 

brain lesions.  Patient was discharged home


Pt Condition on Discharge:  Good


Discharge Disposition:  Discharge Home


Discharge Time:  <= 30 minutes


Discharge Instructions


DIET: Follow Instructions for:  As Tolerated, No Restrictions


Activities you can perform:  Regular-No Restrictions


Follow up Referrals:  


Neurology - 2 Weeks with Dorcas Ashley MD


PCP Follow-up - 1 Week





New Medications:  


Oxycodone-Acetaminophen (Percocet) 5-325 mg Tab


1 TAB PO Q6H PRN for PAIN, #20 TAB 0 Refills





 


Continued Medications:  


Diclofenac Sodium (Voltaren-Xr) 100 Mg Tab.er.24h


1 TAB PO DAILY PRN for HEADACHE





Diltiazem CD 24 HR (Cardizem CD 24 HR) 240 Mg Caper


240 MG PO DAILY, #30 CAP 0 Refills





Furosemide (Lasix) 20 Mg Tab


20 MG PO AS DIRECTED, #30 TAB 0 Refills


Takes PRN for edema


Lisinopril (Lisinopril) 10 Mg Tab


20 MG PO BID, #30 TAB 0 Refills





Methylprednisolone Dosepak (Medrol Dosepak) 4 Mg Dspk


4 MG PO AS DIRECTED, #1 DSPK 0 Refills (This prescription has been renewed)


Per Pharmacist direction


Omeprazole (Omeprazole) 20 Mg Tab


40 MG PO DAILY, #30 TAB 0 Refills





Potassium Chloride ER (Potassium Chloride ER) 20 Meq Tab


20 MEQ PO AS DIRECTED for Electrolyte Replacement, #30 TAB 0 Refills


Takes PRN for edema


Pregabalin (Lyrica) 200 Mg Cap


200 MG PO TID, #90 CAP 0 Refills

















Gissel Purcell MD Sep 4, 2017 10:49

## 2017-09-12 ENCOUNTER — HOSPITAL ENCOUNTER (EMERGENCY)
Dept: HOSPITAL 17 - PHED | Age: 47
Discharge: HOME | End: 2017-09-12
Payer: MEDICAID

## 2017-09-12 VITALS
OXYGEN SATURATION: 97 % | SYSTOLIC BLOOD PRESSURE: 119 MMHG | HEART RATE: 68 BPM | RESPIRATION RATE: 16 BRPM | DIASTOLIC BLOOD PRESSURE: 63 MMHG

## 2017-09-12 VITALS
HEART RATE: 63 BPM | RESPIRATION RATE: 18 BRPM | OXYGEN SATURATION: 98 % | SYSTOLIC BLOOD PRESSURE: 117 MMHG | DIASTOLIC BLOOD PRESSURE: 72 MMHG

## 2017-09-12 VITALS
DIASTOLIC BLOOD PRESSURE: 69 MMHG | TEMPERATURE: 97.7 F | RESPIRATION RATE: 20 BRPM | SYSTOLIC BLOOD PRESSURE: 144 MMHG | HEART RATE: 75 BPM | OXYGEN SATURATION: 95 %

## 2017-09-12 VITALS
OXYGEN SATURATION: 95 % | RESPIRATION RATE: 17 BRPM | TEMPERATURE: 98.1 F | DIASTOLIC BLOOD PRESSURE: 81 MMHG | SYSTOLIC BLOOD PRESSURE: 125 MMHG | HEART RATE: 71 BPM

## 2017-09-12 DIAGNOSIS — I25.2: ICD-10-CM

## 2017-09-12 DIAGNOSIS — I10: ICD-10-CM

## 2017-09-12 DIAGNOSIS — G37.3: Primary | ICD-10-CM

## 2017-09-12 DIAGNOSIS — R51: ICD-10-CM

## 2017-09-12 LAB
ALP SERPL-CCNC: 48 U/L (ref 45–117)
ALT SERPL-CCNC: 23 U/L (ref 10–53)
ANION GAP SERPL CALC-SCNC: 5 MEQ/L (ref 5–15)
APTT BLD: 25 SEC (ref 24.3–30.1)
AST SERPL-CCNC: 12 U/L (ref 15–37)
BACTERIA #/AREA URNS HPF: (no result) /HPF
BASOPHILS # BLD AUTO: 0.1 TH/MM3 (ref 0–0.2)
BASOPHILS NFR BLD: 1.1 % (ref 0–2)
BETA HCG QUANT: (no result) MIU/ML (ref 0–5)
BILIRUB SERPL-MCNC: 0.4 MG/DL (ref 0.2–1)
BUN SERPL-MCNC: 20 MG/DL (ref 7–18)
CHLORIDE SERPL-SCNC: 103 MEQ/L (ref 98–107)
COLOR UR: YELLOW
COMMENT (UR): (no result)
CULTURE IF INDICATED: (no result)
EOSINOPHIL # BLD: 0.1 TH/MM3 (ref 0–0.4)
EOSINOPHIL NFR BLD: 1.6 % (ref 0–4)
ERYTHROCYTE [DISTWIDTH] IN BLOOD BY AUTOMATED COUNT: 13.8 % (ref 11.6–17.2)
GFR SERPLBLD BASED ON 1.73 SQ M-ARVRAT: 88 ML/MIN (ref 89–?)
GLUCOSE UR STRIP-MCNC: (no result) MG/DL
HCO3 BLD-SCNC: 28.6 MEQ/L (ref 21–32)
HCT VFR BLD CALC: 36.4 % (ref 35–46)
HEMO FLAGS: (no result)
HGB UR QL STRIP: (no result)
INR PPP: 0.9 RATIO
KETONES UR STRIP-MCNC: (no result) MG/DL
LEUKOCYTE ESTERASE UR QL STRIP: (no result) /HPF (ref 0–5)
LYMPHOCYTES # BLD AUTO: 2.3 TH/MM3 (ref 1–4.8)
LYMPHOCYTES NFR BLD AUTO: 26.2 % (ref 9–44)
MCH RBC QN AUTO: 32.4 PG (ref 27–34)
MCHC RBC AUTO-ENTMCNC: 33.3 % (ref 32–36)
MCV RBC AUTO: 97.3 FL (ref 80–100)
MONOCYTES NFR BLD: 8.8 % (ref 0–8)
NEUTROPHILS # BLD AUTO: 5.3 TH/MM3 (ref 1.8–7.7)
NEUTROPHILS NFR BLD AUTO: 62.3 % (ref 16–70)
NITRITE UR QL STRIP: (no result)
PLATELET # BLD: 233 TH/MM3 (ref 150–450)
POTASSIUM SERPL-SCNC: 4.2 MEQ/L (ref 3.5–5.1)
PROTHROMBIN TIME: 9.6 SEC (ref 9.8–11.6)
RBC # BLD AUTO: 3.74 MIL/MM3 (ref 4–5.3)
RBC #/AREA URNS HPF: (no result) /HPF (ref 0–3)
SODIUM SERPL-SCNC: 137 MEQ/L (ref 136–145)
SP GR UR STRIP: 1.01 (ref 1–1.03)
SQUAMOUS #/AREA URNS HPF: (no result) /HPF (ref 0–5)
WBC # BLD AUTO: 8.6 TH/MM3 (ref 4–11)

## 2017-09-12 PROCEDURE — 81001 URINALYSIS AUTO W/SCOPE: CPT

## 2017-09-12 PROCEDURE — 85730 THROMBOPLASTIN TIME PARTIAL: CPT

## 2017-09-12 PROCEDURE — 85025 COMPLETE CBC W/AUTO DIFF WBC: CPT

## 2017-09-12 PROCEDURE — 85610 PROTHROMBIN TIME: CPT

## 2017-09-12 PROCEDURE — 99285 EMERGENCY DEPT VISIT HI MDM: CPT

## 2017-09-12 PROCEDURE — 96365 THER/PROPH/DIAG IV INF INIT: CPT

## 2017-09-12 PROCEDURE — 84702 CHORIONIC GONADOTROPIN TEST: CPT

## 2017-09-12 PROCEDURE — 80053 COMPREHEN METABOLIC PANEL: CPT

## 2017-09-12 NOTE — PD
HPI


Chief Complaint:  Headache


Time Seen by Provider:  17:29


Travel History


International Travel<30 days:  No


Contact w/Intl Traveler<30days:  No


Traveled to known affect area:  No





History of Present Illness


HPI


47-year-old female with history of transverse myelitis, recently admitted on 9/2 /17 for a flareup, did well on IV steroids and discharged home with steroid 

taper, here for evaluation of return of symptoms consistent with a flareup of 

her transverse myelitis.  The patient reports a posterior headache described as 

pressure and feels as though she had a lumbar puncture although she has not had 

one in several years.  She also describes left arm paresthesias and urinary 

urgency.  She denies dysuria.  No fevers or chills.  She feels as though her 

left arm may be a little bit weak.  Chart review shows that the patient had a 

brain MRI on 7/2/17 that showed no acute hemorrhage, mass or infarction, there 

are multiple small scattered punctate areas of increasing signal which are 

nonspecific and may or percent chronic small vessel ischemic changes.  She also 

had an MRI of her cervical spine that was read as suboptimal because of motion 

artifact, cervical cord intact with no abnormal enhancement or focal lesion 

identified, annular disc bulges at C5 and C6 and C6 and 7 with mass effect on 

the thecal sac, no mass effect on the cord.





PFSH


Past Medical History


Hx Anticoagulant Therapy:  No


Arthritis:  Yes (left knee)


Autoimmune Disease:  No


Anxiety:  Yes


Depression:  Yes


Heart Rhythm Problems:  No


Cancer:  No


Cardiac Catheterization:  Yes


Cardiovascular Problems:  Yes (heart attack)


Chest Pain:  No


Congestive Heart Failure:  No


Cerebrovascular Accident:  No


Diabetes:  No


Diminished Hearing:  No


Endocrine:  No


Genitourinary:  No


Hypertension:  Yes


Immune Disorder:  No


Neurologic:  Yes (Transverse myelitis, spinal cord lesion )


Psychiatric:  Yes (Mood disorder )


Reproductive:  No


Respiratory:  No


Immunizations Current:  Yes


Migraines:  Yes


Myocardial Infarction:  Yes


Seizures:  No


Pregnant?:  Not Pregnant


Menopausal:  Yes


Tubal Ligation:  Yes





Past Surgical History


Abdominal Surgery:  Yes (hysterectomy)


Body Medical Devices:  bladder sling


Cardiac Surgery:  No


Ear Surgery:  No


Endocrine Surgery:  No


Eye Surgery:  No


Genitourinary Surgery:  Yes (Bladder sling)


Hysterectomy:  Yes


Oral Surgery:  No


Thoracic Surgery:  No





Social History


Alcohol Use:  No


Tobacco Use:  Yes (1 PPD)


Substance Use:  No





Allergies-Medications


(Allergen,Severity, Reaction):  


Coded Allergies:  


     No Known Allergies (Unverified , 9/2/17)


Reported Meds & Prescriptions





Reported Meds & Active Scripts


Active


Lortab (Hydrocodone-Acetaminophen) 5-325 Mg Tab 1 Tab PO Q6H PRN


Norco (Hydrocodone-Acetaminophen) 5-325 mg Tab 1 Tab PO Q6H PRN


Medrol (Methylprednisolone) 8 Mg Tab 8 Mg PO DAILY


     take 8 mg bid x 3 days, then 8 mg daily x 3 days then 4 mg daily x 3


     days


Reported


Potassium Chloride ER (Potassium Chloride) 20 Meq Tab 20 Meq PO AS DIRECTED


     Takes PRN for edema


Lasix (Furosemide) 20 Mg Tab 20 Mg PO AS DIRECTED


     Takes PRN for edema


Voltaren-Xr (Diclofenac Sodium) 100 Mg Tab.er.24h 1 Tab PO DAILY PRN


Lyrica (Pregabalin) 200 Mg Cap 200 Mg PO TID


Omeprazole 20 Mg Tab 40 Mg PO DAILY


Lisinopril 10 Mg Tab 20 Mg PO BID


Cardizem CD 24 HR (Diltiazem CD 24 HR) 240 Mg Caper 240 Mg PO DAILY








Review of Systems


Except as stated in HPI:  all other systems reviewed are Neg





Physical Exam


Narrative


GENERAL: Well-developed, well-nourished, comfortable, no apparent distress.


SKIN: Focused skin assessment warm/dry.


HEAD: Atraumatic. Normocephalic. 


EYES: Pupils equal and round. No scleral icterus. No injection or drainage. 


ENT: Mucous membranes pink and moist.


NECK: Trachea midline. No JVD. 


CARDIOVASCULAR: Regular rate and rhythm.  No murmur appreciated.


RESPIRATORY: No accessory muscle use. Clear to auscultation. Breath sounds 

equal bilaterally. 


GASTROINTESTINAL: Abdomen soft, non-tender, nondistended. 


MUSCULOSKELETAL: No obvious deformities. No clubbing.  No cyanosis.  No edema. 


NEUROLOGICAL: Awake and alert. No obvious cranial nerve deficits.  Motor 

grossly within normal limits. Normal speech.  No focal deficits with normal 

muscle strength in all 4 extremities.


PSYCHIATRIC: Appropriate mood and affect; insight and judgment normal.





Data


Data


Last Documented VS





Vital Signs








  Date Time  Temp Pulse Resp B/P (MAP) Pulse Ox O2 Delivery O2 Flow Rate FiO2


 


9/12/17 19:27 98.1 71 17 125/81 (96) 95 Room Air  








Orders





 Orders


Beta Hcg (Quant/Titer) (9/12/17 18:06)


Complete Blood Count With Diff (9/12/17 18:06)


Comprehensive Metabolic Panel (9/12/17 18:06)


Prothrombin Time / Inr (Pt) (9/12/17 18:06)


Act Partial Throm Time (Ptt) (9/12/17 18:06)


Urinalysis - C+S If Indicated (9/12/17 18:06)


Iv Access Insert/Monitor (9/12/17 18:06)


Ecg Monitoring (9/12/17 18:06)


Oximetry (9/12/17 18:06)


Sodium Chloride 0.9% Flush (Ns Flush) (9/12/17 18:15)


Acetamin-Hydrocod 325-5 Mg (Norco  5-325 (9/12/17 18:15)


Methylprednisolone So Succ Inj (Solumedr (9/12/17 18:15)





Labs





Laboratory Tests








Test


  9/12/17


18:00 9/12/17


18:30


 


Urine Color YELLOW  


 


Urine Turbidity CLOUDY  


 


Urine pH 6.5  


 


Urine Specific Gravity 1.015  


 


Urine Protein NEG mg/dL  


 


Urine Glucose (UA) NEG mg/dL  


 


Urine Ketones NEG mg/dL  


 


Urine Occult Blood SMALL  


 


Urine Nitrite NEG  


 


Urine Bilirubin NEG  


 


Urine Leukocyte Esterase NEG  


 


Urine RBC 0-3 /hpf  


 


Urine WBC 3-5 /hpf  


 


Urine Squamous Epithelial


Cells 6-8 /hpf 


  


 


 


Urine Bacteria FEW /hpf  


 


Microscopic Urinalysis Comment


  CULT NOT


INDICATED 


 


 


White Blood Count  8.6 TH/MM3 


 


Red Blood Count  3.74 MIL/MM3 


 


Hemoglobin  12.1 GM/DL 


 


Hematocrit  36.4 % 


 


Mean Corpuscular Volume  97.3 FL 


 


Mean Corpuscular Hemoglobin  32.4 PG 


 


Mean Corpuscular Hemoglobin


Concent 


  33.3 % 


 


 


Red Cell Distribution Width  13.8 % 


 


Platelet Count  233 TH/MM3 


 


Mean Platelet Volume  9.2 FL 


 


Neutrophils (%) (Auto)  62.3 % 


 


Lymphocytes (%) (Auto)  26.2 % 


 


Monocytes (%) (Auto)  8.8 % 


 


Eosinophils (%) (Auto)  1.6 % 


 


Basophils (%) (Auto)  1.1 % 


 


Neutrophils # (Auto)  5.3 TH/MM3 


 


Lymphocytes # (Auto)  2.3 TH/MM3 


 


Monocytes # (Auto)  0.8 TH/MM3 


 


Eosinophils # (Auto)  0.1 TH/MM3 


 


Basophils # (Auto)  0.1 TH/MM3 


 


CBC Comment  DIFF FINAL 


 


Differential Comment   


 


Prothrombin Time  9.6 SEC 


 


Prothromb Time International


Ratio 


  0.9 RATIO 


 


 


Activated Partial


Thromboplast Time 


  25.0 SEC 


 


 


Blood Urea Nitrogen  20 MG/DL 


 


Creatinine  0.71 MG/DL 


 


Random Glucose  79 MG/DL 


 


Total Protein  6.8 GM/DL 


 


Albumin  3.6 GM/DL 


 


Calcium Level  8.5 MG/DL 


 


Alkaline Phosphatase  48 U/L 


 


Aspartate Amino Transf


(AST/SGOT) 


  12 U/L 


 


 


Alanine Aminotransferase


(ALT/SGPT) 


  23 U/L 


 


 


Total Bilirubin  0.4 MG/DL 


 


Sodium Level  137 MEQ/L 


 


Potassium Level  4.2 MEQ/L 


 


Chloride Level  103 MEQ/L 


 


Carbon Dioxide Level  28.6 MEQ/L 


 


Anion Gap  5 MEQ/L 


 


Estimat Glomerular Filtration


Rate 


  88 ML/MIN 


 


 


Human Chorionic Gonadotropin,


Quant 


  LESS THAN 1


MIU/ML











MDM


Medical Decision Making


Medical Screen Exam Complete:  Yes


Emergency Medical Condition:  Yes


Medical Record Reviewed:  Yes


Differential Diagnosis


Transverse myelitis flareup, UTI, metabolic abnormality, spinal cord 

compression less likely.


Narrative Course


Vital signs reviewed and are within normal limits.





The patient was evaluated by neurologist Dr. Ashley during her last admission.  

I attempted to contact her, however I can only get in touch with the on-call 

neurologist Dr. Rojas.  He recommends giving the patient 250 mg of IV Solu-

Medrol and have her follow-up with Dr. Ashley in her office tomorrow.  The 

patient is amenable to this plan.





CBC is unremarkable.


CMP is unremarkable.


UA is not suggestive of UTI.





Patient was given Lortab 5/325 and 250 mg of IV Solu-Medrol.  She reports that 

her headache is completely resolved.  She ambulated to the restroom without 

assistance, was slightly broad-based gait.  There are no focal deficits on 

exam.  Transabdominal ultrasound was performed and shows only a small amount of 

postvoid residual urine.  Plan at this point is to have her continue her oral 

steroids at home and to follow-up with Dr. Ashley in her office tomorrow.  

Patient informed on when to return to the emergency department.  She verbalizes 

understanding and agreement with plan.





Diagnosis





 Primary Impression:  


 Transverse myelitis


 Additional Impression:  


 Headache


 Qualified Codes:  R51 - Headache


Referrals:  


Docras Ashley MD


1 day


Neurologist





***Additional Instructions:  


Follow-up with neurologist Dr. Ashley tomorrow.


Return to the emergency department for worsening symptoms or any other concerns.


Scripts


Hydrocodone-Acetaminophen (Lortab) 5-325 Mg Tab


1 TAB PO Q6H Y for PAIN, #10 TAB 0 Refills


   Prov: Sha Sprague MD         9/12/17


Disposition:  01 DISCHARGE HOME


Condition:  Stable











Sha Sprague MD Sep 12, 2017 19:59

## 2017-09-13 ENCOUNTER — HOSPITAL ENCOUNTER (INPATIENT)
Dept: HOSPITAL 17 - PHED | Age: 47
LOS: 3 days | Discharge: HOME | DRG: 103 | End: 2017-09-16
Attending: HOSPITALIST | Admitting: HOSPITALIST
Payer: COMMERCIAL

## 2017-09-13 VITALS
SYSTOLIC BLOOD PRESSURE: 149 MMHG | OXYGEN SATURATION: 95 % | HEART RATE: 62 BPM | RESPIRATION RATE: 16 BRPM | TEMPERATURE: 98.1 F | DIASTOLIC BLOOD PRESSURE: 85 MMHG

## 2017-09-13 VITALS — HEIGHT: 63 IN | BODY MASS INDEX: 43.05 KG/M2 | WEIGHT: 242.95 LBS

## 2017-09-13 VITALS
DIASTOLIC BLOOD PRESSURE: 61 MMHG | RESPIRATION RATE: 17 BRPM | SYSTOLIC BLOOD PRESSURE: 132 MMHG | HEART RATE: 89 BPM | TEMPERATURE: 97.8 F

## 2017-09-13 VITALS
OXYGEN SATURATION: 94 % | DIASTOLIC BLOOD PRESSURE: 59 MMHG | HEART RATE: 65 BPM | RESPIRATION RATE: 18 BRPM | SYSTOLIC BLOOD PRESSURE: 105 MMHG

## 2017-09-13 DIAGNOSIS — F17.210: ICD-10-CM

## 2017-09-13 DIAGNOSIS — F41.9: ICD-10-CM

## 2017-09-13 DIAGNOSIS — R51: Primary | ICD-10-CM

## 2017-09-13 DIAGNOSIS — K58.9: ICD-10-CM

## 2017-09-13 DIAGNOSIS — I25.2: ICD-10-CM

## 2017-09-13 DIAGNOSIS — E53.8: ICD-10-CM

## 2017-09-13 DIAGNOSIS — M17.10: ICD-10-CM

## 2017-09-13 DIAGNOSIS — I10: ICD-10-CM

## 2017-09-13 DIAGNOSIS — F32.9: ICD-10-CM

## 2017-09-13 LAB
ANION GAP SERPL CALC-SCNC: 9 MEQ/L (ref 5–15)
BACTERIA #/AREA URNS HPF: (no result) /HPF
BASOPHILS # BLD AUTO: 0.7 TH/MM3 (ref 0–0.2)
BASOPHILS NFR BLD: 4.6 % (ref 0–2)
BUN SERPL-MCNC: 28 MG/DL (ref 7–18)
CHLORIDE SERPL-SCNC: 104 MEQ/L (ref 98–107)
COLOR UR: YELLOW
COMMENT (UR): (no result)
CULTURE IF INDICATED: (no result)
EOSINOPHIL # BLD: 0 TH/MM3 (ref 0–0.4)
EOSINOPHIL NFR BLD: 0 % (ref 0–4)
ERYTHROCYTE [DISTWIDTH] IN BLOOD BY AUTOMATED COUNT: 12.9 % (ref 11.6–17.2)
GFR SERPLBLD BASED ON 1.73 SQ M-ARVRAT: 53 ML/MIN (ref 89–?)
GLUCOSE UR STRIP-MCNC: (no result) MG/DL
HCO3 BLD-SCNC: 24.9 MEQ/L (ref 21–32)
HCT VFR BLD CALC: 35.2 % (ref 35–46)
HEMO FLAGS: (no result)
HGB UR QL STRIP: (no result)
KETONES UR STRIP-MCNC: (no result) MG/DL
LEUKOCYTE ESTERASE UR QL STRIP: (no result) /HPF (ref 0–5)
LYMPHOCYTES # BLD AUTO: 1.6 TH/MM3 (ref 1–4.8)
LYMPHOCYTES NFR BLD AUTO: 10.8 % (ref 9–44)
MCH RBC QN AUTO: 33.2 PG (ref 27–34)
MCHC RBC AUTO-ENTMCNC: 34.9 % (ref 32–36)
MCV RBC AUTO: 95 FL (ref 80–100)
MONOCYTES NFR BLD: 9.2 % (ref 0–8)
NEUTROPHILS # BLD AUTO: 11.4 TH/MM3 (ref 1.8–7.7)
NEUTROPHILS NFR BLD AUTO: 75.4 % (ref 16–70)
NITRITE UR QL STRIP: (no result)
PLATELET # BLD: 248 TH/MM3 (ref 150–450)
POTASSIUM SERPL-SCNC: 4.2 MEQ/L (ref 3.5–5.1)
RBC # BLD AUTO: 3.7 MIL/MM3 (ref 4–5.3)
RBC #/AREA URNS HPF: (no result) /HPF (ref 0–3)
SODIUM SERPL-SCNC: 138 MEQ/L (ref 136–145)
SP GR UR STRIP: 1.03 (ref 1–1.03)
SQUAMOUS #/AREA URNS HPF: > 8 /HPF (ref 0–5)
WBC # BLD AUTO: 15.1 TH/MM3 (ref 4–11)

## 2017-09-13 PROCEDURE — 85730 THROMBOPLASTIN TIME PARTIAL: CPT

## 2017-09-13 PROCEDURE — 72197 MRI PELVIS W/O & W/DYE: CPT

## 2017-09-13 PROCEDURE — 85025 COMPLETE CBC W/AUTO DIFF WBC: CPT

## 2017-09-13 PROCEDURE — 82607 VITAMIN B-12: CPT

## 2017-09-13 PROCEDURE — 81001 URINALYSIS AUTO W/SCOPE: CPT

## 2017-09-13 PROCEDURE — 86592 SYPHILIS TEST NON-TREP QUAL: CPT

## 2017-09-13 PROCEDURE — 72141 MRI NECK SPINE W/O DYE: CPT

## 2017-09-13 PROCEDURE — 85610 PROTHROMBIN TIME: CPT

## 2017-09-13 PROCEDURE — 72158 MRI LUMBAR SPINE W/O & W/DYE: CPT

## 2017-09-13 PROCEDURE — 96365 THER/PROPH/DIAG IV INF INIT: CPT

## 2017-09-13 PROCEDURE — A9579 GAD-BASE MR CONTRAST NOS,1ML: HCPCS

## 2017-09-13 PROCEDURE — 84702 CHORIONIC GONADOTROPIN TEST: CPT

## 2017-09-13 PROCEDURE — 83921 ORGANIC ACID SINGLE QUANT: CPT

## 2017-09-13 PROCEDURE — 86038 ANTINUCLEAR ANTIBODIES: CPT

## 2017-09-13 PROCEDURE — 80048 BASIC METABOLIC PNL TOTAL CA: CPT

## 2017-09-13 PROCEDURE — 72157 MRI CHEST SPINE W/O & W/DYE: CPT

## 2017-09-13 PROCEDURE — 80053 COMPREHEN METABOLIC PANEL: CPT

## 2017-09-13 PROCEDURE — 86235 NUCLEAR ANTIGEN ANTIBODY: CPT

## 2017-09-13 NOTE — PD
HPI


Chief Complaint:  Headache


Time Seen by Provider:  20:24


Travel History


International Travel<30 days:  No


Contact w/Intl Traveler<30days:  No


Traveled to known affect area:  No





History of Present Illness


HPI


This 47-year-old female is complaining of posterior occipital headache and 

paresthesias of the left arm equal in the right leg.  She has a history of 

transverse myelitis which has flared up several times through the years.  She 

was admitted for a flareup September 2 and was given high-dose steroids.  She 

was in the hospital for several days.  She went home and started having some 

mild recurrence of symptoms this if really paresthesias in the left arm and the 

right leg.  The left arm has been weak since the episode.  She has also had 

some urinary incontinence.  She came back to the ER yesterday and saw Dr. Muller.  He spoke with neurology and gave her 250 mg of Solu-Medrol which she 

says helped her headache.  She was then





PFSH


Past Medical History


Hx Anticoagulant Therapy:  No


Arthritis:  Yes (left knee)


Autoimmune Disease:  No


Anxiety:  Yes


Depression:  Yes


Heart Rhythm Problems:  No


Cancer:  No


Cardiac Catheterization:  Yes


Cardiovascular Problems:  Yes


Chest Pain:  No


Congestive Heart Failure:  No


Cerebrovascular Accident:  No


Diabetes:  No


Diminished Hearing:  No


Endocrine:  No


Genitourinary:  No


Hypertension:  Yes


Immune Disorder:  No


Neurologic:  Yes (Transverse myelitis, spinal cord lesion )


Psychiatric:  Yes (Mood disorder )


Reproductive:  No


Respiratory:  No


Immunizations Current:  Yes


Migraines:  Yes


Myocardial Infarction:  Yes


Seizures:  No


Tetanus Vaccination:  Unknown


Influenza Vaccination:  No


Pregnant?:  Not Pregnant


Menopausal:  Yes


Tubal Ligation:  Yes





Past Surgical History


Abdominal Surgery:  Yes (hysterectomy)


Body Medical Devices:  bladder sling


Cardiac Surgery:  No


Ear Surgery:  No


Endocrine Surgery:  No


Eye Surgery:  No


Genitourinary Surgery:  Yes (Bladder sling)


Hysterectomy:  Yes


Oral Surgery:  No


Thoracic Surgery:  No


Other Surgery:  Yes





Social History


Alcohol Use:  No


Tobacco Use:  Yes (1 PPD)


Substance Use:  No





Allergies-Medications


(Allergen,Severity, Reaction):  


Coded Allergies:  


     acetaminophen (Verified  Allergy, Intermediate, Nausea/Vomiting, 9/13/17)


     oxycodone (Verified  Allergy, Intermediate, Nausea/Vomiting, 9/13/17)


Reported Meds & Prescriptions





Reported Meds & Active Scripts


Active


Lortab (Hydrocodone-Acetaminophen) 5-325 Mg Tab 1 Tab PO Q6H PRN


Medrol (Methylprednisolone) 8 Mg Tab 8 Mg PO DAILY


     take 8 mg bid x 3 days, then 8 mg daily x 3 days then 4 mg daily x 3


     days


Reported


Potassium Chloride ER (Potassium Chloride) 20 Meq Tab 20 Meq PO AS DIRECTED


     Takes PRN for edema


Lasix (Furosemide) 20 Mg Tab 20 Mg PO AS DIRECTED


     Takes PRN for edema


Voltaren-Xr (Diclofenac Sodium) 100 Mg Tab.er.24h 1 Tab PO DAILY PRN


Lyrica (Pregabalin) 200 Mg Cap 200 Mg PO TID


Omeprazole 20 Mg Tab 40 Mg PO DAILY


Lisinopril 10 Mg Tab 20 Mg PO BID


Cardizem CD 24 HR (Diltiazem CD 24 HR) 240 Mg Caper 240 Mg PO DAILY








Review of Systems


General / Constitutional:  No: Fever, Chills


Eyes:  No: Diploplia


HENT:  Positive: Headaches


Cardiovascular:  No: Chest Pain or Discomfort, Palpitations


Respiratory:  No: Cough, Shortness of Breath


Gastrointestinal:  No: Nausea, Vomiting


Genitourinary:  No: Urgency, Frequency


Musculoskeletal:  Positive: Myalgias


Skin:  No Rash


Neurologic:  Positive: Weakness, Incontinence, Sensory Disturbance


Psychiatric:  No: Anxiety


Hematologic/Lymphatic:  No: Easy Bruising





Physical Exam


Narrative


GENERAL: Well-developed female


SKIN: Focused skin assessment warm/dry.


HEAD: Atraumatic. Normocephalic. 


EYES: Pupils equal and round. No scleral icterus. No injection or drainage. 


ENT: No nasal bleeding or discharge.  Mucous membranes pink and moist.


NECK: Trachea midline. No JVD. 


CARDIOVASCULAR: Regular rate and rhythm.  No murmur appreciated.


RESPIRATORY: No accessory muscle use. Clear to auscultation. Breath sounds 

equal bilaterally. 


GASTROINTESTINAL: Abdomen soft, non-tender, nondistended. Hepatic and splenic 

margins not palpable. 


MUSCULOSKELETAL: No obvious deformities. No clubbing.  No cyanosis.  No edema. 


NEUROLOGICAL: Awake and alert. No obvious cranial nerve deficits.  Motor 

grossly within normal limits. Normal speech.  Complains of some diminished 

sensation on the arms bilaterally


PSYCHIATRIC: Appropriate mood and affect; insight and judgment normal.





Data


Data


Last Documented VS





Vital Signs








  Date Time  Temp Pulse Resp B/P (MAP) Pulse Ox O2 Delivery O2 Flow Rate FiO2


 


9/13/17 20:25      Room Air  


 


9/13/17 20:09 97.8 89 17 132/61 (84)    








Orders





 Orders


Complete Blood Count With Diff (9/13/17 21:07)


Basic Metabolic Panel (Bmp) (9/13/17 21:07)


Urinalysis - C+S If Indicated (9/13/17 21:07)


Pantoprazole (Protonix) (9/14/17 09:00)


Methylprednisolone So Succ Inj (Solumedr (9/13/17 21:15)


Admit Order (Ed Use Only) (9/13/17 21:22)








MDM


Medical Decision Making


Medical Screen Exam Complete:  Yes


Emergency Medical Condition:  Yes


Medical Record Reviewed:  Yes


Differential Diagnosis


Differential includes transverse myelitis, MS


Narrative Course


This lady has been diagnosed with transverse myelitis and has responded to Solu-

Medrol.  She has had a mild exacerbation manifested primarily by sensory 

deficits at this time.  I have spoken with Dr. Bosch and he recommends that she 

gets 3 days of Solu-Medrol 250 mg IV every 6





Diagnosis





 Primary Impression:  


 Transverse myelitis





Admitting Information


Admitting Physician Requests:  Admit











Carlos Arellano MD Sep 13, 2017 21:06

## 2017-09-14 VITALS
RESPIRATION RATE: 18 BRPM | HEART RATE: 83 BPM | OXYGEN SATURATION: 95 % | SYSTOLIC BLOOD PRESSURE: 118 MMHG | TEMPERATURE: 96.6 F | DIASTOLIC BLOOD PRESSURE: 75 MMHG

## 2017-09-14 VITALS
DIASTOLIC BLOOD PRESSURE: 74 MMHG | OXYGEN SATURATION: 96 % | RESPIRATION RATE: 20 BRPM | SYSTOLIC BLOOD PRESSURE: 140 MMHG | HEART RATE: 69 BPM | TEMPERATURE: 97.9 F

## 2017-09-14 VITALS
HEART RATE: 62 BPM | OXYGEN SATURATION: 95 % | SYSTOLIC BLOOD PRESSURE: 149 MMHG | TEMPERATURE: 98.1 F | RESPIRATION RATE: 16 BRPM | DIASTOLIC BLOOD PRESSURE: 85 MMHG

## 2017-09-14 VITALS
HEART RATE: 71 BPM | RESPIRATION RATE: 16 BRPM | DIASTOLIC BLOOD PRESSURE: 59 MMHG | SYSTOLIC BLOOD PRESSURE: 103 MMHG | OXYGEN SATURATION: 95 % | TEMPERATURE: 98.2 F

## 2017-09-14 VITALS
RESPIRATION RATE: 20 BRPM | TEMPERATURE: 97.8 F | HEART RATE: 67 BPM | DIASTOLIC BLOOD PRESSURE: 75 MMHG | OXYGEN SATURATION: 95 % | SYSTOLIC BLOOD PRESSURE: 141 MMHG

## 2017-09-14 VITALS
DIASTOLIC BLOOD PRESSURE: 73 MMHG | RESPIRATION RATE: 20 BRPM | TEMPERATURE: 97.8 F | SYSTOLIC BLOOD PRESSURE: 140 MMHG | HEART RATE: 69 BPM | OXYGEN SATURATION: 96 %

## 2017-09-14 LAB
ANION GAP SERPL CALC-SCNC: 8 MEQ/L (ref 5–15)
BASOPHILS # BLD AUTO: 0 TH/MM3 (ref 0–0.2)
BASOPHILS NFR BLD: 0.1 % (ref 0–2)
BUN SERPL-MCNC: 25 MG/DL (ref 7–18)
CHLORIDE SERPL-SCNC: 105 MEQ/L (ref 98–107)
EOSINOPHIL # BLD: 0 TH/MM3 (ref 0–0.4)
EOSINOPHIL NFR BLD: 0.1 % (ref 0–4)
ERYTHROCYTE [DISTWIDTH] IN BLOOD BY AUTOMATED COUNT: 13.2 % (ref 11.6–17.2)
GFR SERPLBLD BASED ON 1.73 SQ M-ARVRAT: 86 ML/MIN (ref 89–?)
HCO3 BLD-SCNC: 25.1 MEQ/L (ref 21–32)
HCT VFR BLD CALC: 35.1 % (ref 35–46)
HEMO FLAGS: (no result)
LYMPHOCYTES # BLD AUTO: 0.7 TH/MM3 (ref 1–4.8)
LYMPHOCYTES NFR BLD AUTO: 6.8 % (ref 9–44)
MCH RBC QN AUTO: 32.4 PG (ref 27–34)
MCHC RBC AUTO-ENTMCNC: 33.5 % (ref 32–36)
MCV RBC AUTO: 96.7 FL (ref 80–100)
MONOCYTES NFR BLD: 0.9 % (ref 0–8)
NEUTROPHILS # BLD AUTO: 9.3 TH/MM3 (ref 1.8–7.7)
NEUTROPHILS NFR BLD AUTO: 92.1 % (ref 16–70)
PLATELET # BLD: 214 TH/MM3 (ref 150–450)
POTASSIUM SERPL-SCNC: 4.2 MEQ/L (ref 3.5–5.1)
RBC # BLD AUTO: 3.63 MIL/MM3 (ref 4–5.3)
SODIUM SERPL-SCNC: 138 MEQ/L (ref 136–145)
WBC # BLD AUTO: 10.1 TH/MM3 (ref 4–11)

## 2017-09-14 RX ADMIN — Medication SCH ML: at 20:57

## 2017-09-14 RX ADMIN — HYDROCODONE BITARTRATE AND ACETAMINOPHEN PRN TAB: 5; 325 TABLET ORAL at 18:49

## 2017-09-14 RX ADMIN — HYDROCODONE BITARTRATE AND ACETAMINOPHEN PRN TAB: 5; 325 TABLET ORAL at 03:18

## 2017-09-14 RX ADMIN — PANTOPRAZOLE SCH MG: 40 TABLET, DELAYED RELEASE ORAL at 08:35

## 2017-09-14 RX ADMIN — HYDROCODONE BITARTRATE AND ACETAMINOPHEN PRN TAB: 5; 325 TABLET ORAL at 08:35

## 2017-09-14 RX ADMIN — HYDROCODONE BITARTRATE AND ACETAMINOPHEN PRN TAB: 5; 325 TABLET ORAL at 15:33

## 2017-09-14 RX ADMIN — Medication SCH ML: at 09:00

## 2017-09-14 NOTE — RADRPT
EXAM DATE/TIME:  09/14/2017 13:45 

 

[facility] COMPARISON:     

No previous studies available for comparison.

       

 

 

INDICATIONS :     

Transverse myelitis, bilateral arm and leg numbness and headache.

                     

 

CONTRAST:     

20 cc Omniscan (gadodiamide) IV

                     

 

MEDICAL HISTORY :     

Hypertension. Myocardial infarction.   

 

SURGICAL HISTORY :     

Hysterectomy.     Bladder sling.

 

ENCOUNTER:     

Subsequent

 

ACUITY:     

3 weeks

 

PAIN SCORE:     

10/10

 

LOCATION:         

Head.

 

TECHNIQUE:     

Multiplanar multisequence MRI of the thoracic spine was performed.

 

FINDINGS:     

 

VERTEBRA:     

Normal vertebral body height.  Homogeneous marrow signal.

 

ALIGNMENT:     

Normal.

 

CORD:     

Normal position and configuration.

 

POST CONTRAST:     

No abnormal areas of contrast enhancement seen.

 

T1-T2:     

Normal.

 

T2-T3:     

The thecal sac has a normal diameter.  No evidence of disc bulge or protrusion.

 

T3-T4:     

The thecal sac has a normal diameter.  No evidence of disc bulge or protrusion.

 

T4-T5:     

The thecal sac has a normal diameter.  No evidence of disc bulge or protrusion.

 

T5-T6:     

The thecal sac has a normal diameter. There is a very tiny central disc bulge at this level resulting
 in no spinal stenosis or cord impingement.

 

T6-T7:     

The thecal sac has a normal diameter.  No evidence of disc bulge or protrusion.

 

T7-T8:     

The thecal sac has a normal diameter.  No evidence of disc bulge or protrusion.

 

T8-T9:     

The thecal sac has a normal diameter.  No evidence of disc bulge or protrusion.

 

T9-T10:   

The thecal sac has a normal diameter.  No evidence of disc bulge or protrusion.

 

T10-T11: 

The thecal sac has a normal diameter.  No evidence of disc bulge or protrusion.

 

T11-T12: 

The thecal sac has a normal diameter.  No evidence of disc bulge or protrusion.

 

T12-L1:   

The thecal sac has a normal diameter. Minimal diffuse disc bulge is noted resulting in no spinal sten
osis or nerve root impingement. Loss of disc height and signal is noted consistent with degenerative 
disc disease.

 

CONCLUSION:     

1. No evidence of thoracic cord abnormality. 

2. Tiny central disc bulge at T5-6. 

3. Minimal diffuse disc bulge at T12-L1. 

4. Mild degenerative disc disease at T12-L1. 

5. No spinal stenosis, cord compression or nerve root impingement. 

 

 

 Aj Looney MD on September 14, 2017 at 15:06           

Board Certified Radiologist.

 This report was verified electronically.

## 2017-09-14 NOTE — RADRPT
EXAM DATE/TIME:  09/14/2017 13:45 

 

[facility] COMPARISON:     

No previous studies available for comparison.

       

 

 

INDICATIONS :     

Transverse myelitis, bilateral arm and leg numbness and headache.

                     

 

CONTRAST:     

20 cc Omniscan (gadodiamide) IV

                     

 

MEDICAL HISTORY :     

Hypertension. Myocardial infarction.   

 

SURGICAL HISTORY :     

Hysterectomy.     Bladder sling.

 

ENCOUNTER:     

Subsequent

 

ACUITY:     

2 weeks

 

PAIN SCORE:     

10/10

 

LOCATION:         

head.

 

TECHNIQUE:     

Multiplanar multisequence MRI examination of the sacrum/coccyx was performed.

 

FINDINGS:     

 

BONE/CARTILAGE:     

Bone marrow signal is homogeneous. Articular cartilage signal is within normal limits.

 

MUSCLES/TENDONS:     

All of the visualized muscles and tendons are intact.

 

MISCELLANEOUS:     

Neurovascular structures are within normal limits.

 

CONCLUSION:     

Normal examination. No abnormal areas of bone edema or enhancement are noted.  

 

 

 

 Jared Layne MD on September 14, 2017 at 15:39           

Board Certified Radiologist.

 This report was verified electronically.

## 2017-09-14 NOTE — HHI.HP
__________________________________________________





hospitals


Service


[facility]


Primary Care Physician


Non-Staff


Admission Diagnosis





TRANSVERSE MYELITIS


Diagnoses:  


(1) Cephalgia


Diagnosis:  Principal





(2) Paresthesia


Diagnosis:  Principal





(3) Transverse myelitis


Diagnosis:  Secondary





Chief Complaint:  


Headache, paresthesia


Travel History


International Travel<30 Days:  No


Contact w/Intl Traveler <30 Da:  No


Traveled to Known Affected Are:  No


History of Present Illness


Written by Oswald Quinones, acting as scribe for Dr. Dickson on 17 at 

13:31. 





This is a 47-year-old  female with known history of hypertension, 

chronic smoker,-year-old bowel syndrome, history transverse myelitis who re-

presented to the hospital again because of neurological symptoms to include 

headache, bladder pressure, left leg heaviness, weakness.  Patient was just 

recently admitted to the hospital on  and was discharged on 

2017 after receiving 1 g IV Solu-Medrol for 3 days.  Patient 

discharge she was feeling a little bit better from when she was admitted 

previously.  She did not obtain outpatient follow-up after her discharge as of 

yet.  She returned to the emergency department again the day before yesterday 

because of left leg heaviness and weakness.  Patient states that she had a 

lifted up higher in order to get out of the bathtub and shower.  She had a 

headache which she thought might be a spinal headache so she came to the 

hospital.  Patient was evaluated by Dr. Sprague and he contacted neurology who 

recommended giving Solu-Medrol  mg and he notified the patient to follow-

up with the neurologist the next day.  Patient states that she called at 8:158:

30 in the morning


And did not hear anything back from them so she called him back and to 20 the 

afternoon to be seen, however she was told that she was unable to be seen 

because there are no appointments and Dr. Ashley had already gone for the day.  

The patient called back to the hospital and spoke with the ER charge nurse and 

.  Because they could not notify the patient, steroids she is to 

take they recommended her to come back to get reevaluated in the emergency 

department.  Patient was reevaluated by the ER physician and they spoke to the 

neurologist on-call who indicated patient should be admitted to the hospital 

for IV Solu-Medrol.  Patient has not had a formal workup in over 10 years for 

the transverse myelitis.  She has documentation that she was told to carry 

around with her at all time to notify a any physician that she has transverse 

myelitis of the cervical spine.,  However MRI of the cervical spine was done on 

her last visit and there was no cord abnormality, abnormal enhancement or focal 

lesions were identified.  This was discussed with the patient and  

extensively today.  Notified them that we'll be doing a full neurological 

workup to evaluate for her symptoms and etiology.  She may need different 

treatment than IV Solu-Medrol to include but not limited to plasmapheresis





Review of Systems


Neurologic:  COMPLAINS OF: Headache, Paresthesias


Except as stated in HPI:  all other systems reviewed are Neg





Past Family Social History


Past Medical History


Hypertension


History of transverse myelitis


Chronic smoker,


irritable bowel syndrome


History of myocardial infarction


Past Surgical History


Cardiac catheterization with normal findings


Bladder sling


Tubal ligation


Hysterectomy


Reported Medications





Reported Meds & Active Scripts


Active


Lortab (Hydrocodone-Acetaminophen) 5-325 Mg Tab 1 Tab PO Q6H PRN


Medrol (Methylprednisolone) 8 Mg Tab 8 Mg PO DAILY


     take 8 mg bid x 3 days, then 8 mg daily x 3 days then 4 mg daily x 3


     days


Reported


Potassium Chloride ER (Potassium Chloride) 20 Meq Tab 20 Meq PO AS DIRECTED


     Takes PRN for edema


Lasix (Furosemide) 20 Mg Tab 20 Mg PO AS DIRECTED


     Takes PRN for edema


Voltaren-Xr (Diclofenac Sodium) 100 Mg Tab.er.24h 1 Tab PO DAILY PRN


Lyrica (Pregabalin) 200 Mg Cap 200 Mg PO TID


Omeprazole 20 Mg Tab 40 Mg PO DAILY


Lisinopril 10 Mg Tab 20 Mg PO BID


Cardizem CD 24 HR (Diltiazem CD 24 HR) 240 Mg Caper 240 Mg PO DAILY


Allergies:  


Coded Allergies:  


     acetaminophen (Verified  Allergy, Intermediate, Nausea/Vomiting, 17)


     oxycodone (Verified  Allergy, Intermediate, Nausea/Vomiting, 17)


Family History


Father alive is 70 years old with history of prostate cancer, mother alive at 

age 68 with lupus, diabetes, thyroid


Social History


Patient continues to smoke one pack a cigarettes a day since she was 16 years 

old.  She denies any alcohol or illicit drugs





Physical Exam


Vital Signs





Vital Signs








  Date Time  Temp Pulse Resp B/P (MAP) Pulse Ox O2 Delivery O2 Flow Rate FiO2


 


17 12:00 97.9 69 20 140/74 (96) 96   


 


17 08:00 97.8 67 20 141/75 (97) 95   


 


17 04:00 98.2 71 16 103/59 (74) 95   


 


17 00:00 98.1 62 16 149/85 (106) 95   


 


17 22:26  70 18  94   


 


17 21:41  65 18 105/59 (74) 94 Room Air  


 


17 21:31 98.1 62 16 149/85 (106) 95   


 


17 20:25      Room Air  


 


17 20:09 97.8 89 17 132/61 (84)    








Physical Exam


GENERAL: Well-developed, well-nourished, in no acute distress. alert and 

orientated


HEENT: Head is normocephalic without any lesions or masses noted. Facial 

features are symmetric. Eyes: Pupils equal round reactive to light. Extraocular 

muscles are intact. Conjunctivae were clear. Oropharyngeal: Pharynx without any 

erythema edema. Tongue is midline without deviation. Buccal mucosa is moist 

without any masses or lesions


NECK: Supple without any masses. Trachea midline no deviation. No JVD, no 

bruits are appreciated


CARDIAC: Regular rhythm, regular rate. S1/S2 are heard. No murmurs gallops or 

rubs.


LUNGS: Clear to auscultation bilaterally. No wheeze, rhonchi or rales. No use 

of accessory muscles on inspiration or expiration.


ABDOMEN: Soft, nontender. Nondistended. Bowel sounds heard in all 4 quadrants. 

No organomegaly or masses. Negative rebound, negative guarding


EXTREMITIES: No edema, pulses are equal bilaterally. No cyanosis or clubbing


NEUROLOGY: Mood and affect appear appropriate. Cranial nerves II through XII 

grossly intact. Muscle strength 5/5 in upper and lower extremities bilaterally. 

Deep tendon reflexes are 2+ in upper and lower extremities bilaterally.


Laboratory





Laboratory Tests








Test


  17


21:29 17


06:20 17


09:53


 


White Blood Count 15.1  10.1  


 


Red Blood Count 3.70  3.63  


 


Hemoglobin 12.3  11.8  


 


Hematocrit 35.2  35.1  


 


Mean Corpuscular Volume 95.0  96.7  


 


Mean Corpuscular Hemoglobin 33.2  32.4  


 


Mean Corpuscular Hemoglobin


Concent 34.9 


  33.5 


  


 


 


Red Cell Distribution Width 12.9  13.2  


 


Platelet Count 248  214  


 


Mean Platelet Volume 8.9  9.3  


 


Neutrophils (%) (Auto) 75.4  92.1  


 


Lymphocytes (%) (Auto) 10.8  6.8  


 


Monocytes (%) (Auto) 9.2  0.9  


 


Eosinophils (%) (Auto) 0.0  0.1  


 


Basophils (%) (Auto) 4.6  0.1  


 


Neutrophils # (Auto) 11.4  9.3  


 


Lymphocytes # (Auto) 1.6  0.7  


 


Monocytes # (Auto) 1.4  0.1  


 


Eosinophils # (Auto) 0.0  0.0  


 


Basophils # (Auto) 0.7  0.0  


 


CBC Comment DIFF FINAL  DIFF FINAL  


 


Differential Comment     


 


Urine Color YELLOW   


 


Urine Turbidity CLEAR   


 


Urine pH 6.0   


 


Urine Specific Gravity 1.033   


 


Urine Protein NEG   


 


Urine Glucose (UA) NEG   


 


Urine Ketones NEG   


 


Urine Occult Blood SMALL   


 


Urine Nitrite NEG   


 


Urine Bilirubin NEG   


 


Urine Leukocyte Esterase NEG   


 


Urine RBC 0-3   


 


Urine WBC 3-5   


 


Urine Squamous Epithelial


Cells > 8 


  


  


 


 


Urine Bacteria FEW   


 


Microscopic Urinalysis Comment


  CULT NOT


INDICATED 


  


 


 


Blood Urea Nitrogen 28  25  


 


Creatinine 1.10  0.72  


 


Random Glucose 109  134  


 


Calcium Level 8.5  8.4  


 


Sodium Level 138  138  


 


Potassium Level 4.2  4.2  


 


Chloride Level 104  105  


 


Carbon Dioxide Level 24.9  25.1  


 


Anion Gap 9  8  


 


Estimat Glomerular Filtration


Rate 53 


  86 


  


 








Result Diagram:  


17








Caprini VTE Risk Assessment


Caprini VTE Risk Assessment:  No/Low Risk (score <= 1)


Caprini Risk Assessment Model











 Point Value = 1          Point Value = 2  Point Value = 3  Point Value = 5


 


Age 41-60


Minor surgery


BMI > 25 kg/m2


Swollen legs


Varicose veins


Pregnancy or postpartum


History of unexplained or recurrent


   spontaneous 


Oral contraceptives or hormone


   replacement


Sepsis (< 1 month)


Serious lung disease, including


   pneumonia (< 1 month)


Abnormal pulmonary function


Acute myocardial infarction


Congestive heart failure (< 1 month)


History of inflammatory bowel disease


Medical patient at bed rest Age 61-74


Arthroscopic surgery


Major open surgery (> 45 min)


Laparoscopic surgery (> 45 min)


Malignancy


Confined to bed (> 72 hours)


Immobilizing plaster cast


Central venous access Age >= 75


History of VTE


Family history of VTE


Factor V Leiden


Prothrombin 69348O


Lupus anticoagulant


Anticardiolipin antibodies


Elevated serum homocysteine


Heparin-induced thrombocytopenia


Other congenital or acquired


   thrombophilia Stroke (< 1 month)


Elective arthroplasty


Hip, pelvis, or leg fracture


Acute spinal cord injury (< 1 month)








Prophylaxis Regimen











   Total Risk


Factor Score Risk Level Prophylaxis Regimen


 


0-1      Low Early ambulation


 


2 Moderate Order ONE of the following:


*Sequential Compression Device (SCD)


*Heparin 5000 units SQ BID


 


3-4 Higher Order ONE of the following medications:


*Heparin 5000 units SQ TID


*Enoxaparin/Lovenox 40 mg SQ daily (WT < 150 kg, CrCl > 30 mL/min)


*Enoxaparin/Lovenox 30 mg SQ daily (WT < 150 kg, CrCl > 10-29 mL/min)


*Enoxaparin/Lovenox 30 mg SQ BID (WT < 150 kg, CrCl > 30 mL/min)


AND/OR


*Sequential Compression Device (SCD)


 


5 or more Highest Order ONE of the following medications:


*Heparin 5000 units SQ TID (Preferred with Epidurals)


*Enoxaparin/Lovenox 40 mg SQ daily (WT < 150 kg, CrCl > 30 mL/min)


*Enoxaparin/Lovenox 30 mg SQ daily (WT < 150 kg, CrCl > 10-29 mL/min)


*Enoxaparin/Lovenox 30 mg SQ BID (WT < 150 kg, CrCl > 30 mL/min)


AND


*Sequential Compression Device (SCD)











Assessment and Plan


Assessment and Plan


Paresthesia, cephalgia with history of transverse myelitis


   Patient with recent hospitalization with completed treatment of IV Solu-

Medrol with recurrent symptoms


   Would recommend full neurological evaluation at this time since patient did 

possibly fail treatment with Solu-Medrol.  After discussion with neurology was 

also recommended full evaluation.


   Obtain MRI study of thoracic, lumbar, sacral spine.  Previous MRI of the 

brain did not indicate any acute abnormality, MRI of the cervical spine did not 

indicate any cord lesions.


   Further laboratory study to include SSA/SSB antibodies, vitamin B12, 

methylmalonic acid, RPR, COURTNEY, NMO-IgG antibodies, anti-aquaporin-4 IgG


   Patient is continued on IV Solu-Medrol at this time, however if resistance 

and further testing proves otherwise patient may need plasmapheresis


   Awaiting full recommendations from neurologist.  Patient may need lumbar 

puncture performed





Hypertension


   Continue home medications





DVT prevention


   Low risk, early ambulation





This note was transcribed by nella Quinones,.  I, Dr. David Richards personally performed the history, physical exam, and medical 

decision making; and confirmed the accuracy of the information in the 

transcribed note.





Authenticated by Dr. David Richards on 17 at 13:45.


Discussed Condition With


Patient, patient's , nursing staff, neurologist





Physician Certification


2 Midnight Certification Type:  Admission for Inpatient Services


Order for Inpatient Services


The services are ordered in accordance with Medicare regulations or non-

Medicare payer requirements, as applicable.  In the case of services not 

specified as inpatient-only, they are appropriately provided as inpatient 

services in accordance with the 2-midnight benchmark.


Estimated LOS (days):  2


 days is the estimated time the patient will need to remain in the hospital, 

assuming treatment plan goals are met and no additional complications.


Post-Hospital Plan:  Not yet determined











Oswald Quinones Sep 14, 2017 13:47


David Davies MD Sep 14, 2017 18:22

## 2017-09-14 NOTE — MB
cc:

MIKE NASH M.D.

****

 

 

DATE OF CONSULTATION:  9/14/2017

 

HISTORY OF PRESENT ILLNESS

The patient is seen in neurological consultation today.  She is a 47-year-old

woman with a diagnosis of transverse myelitis 10 years ago, apparently

involving the cervical spinal cord.  She was treated with steroids.  She did

well and as far as I could father she had no recurrence of symptoms until 10 or

11 days ago when she came to the hospital and was admitted with some sensory

and motor but predominantly sensory symptoms involving all four extremities.

She was treated with Solu-Medrol one gram daily for three days and improved and

went home, but three days later she had recurrence of symptoms.  She went home

on a Medrol Dosepak.  She actually came to the emergency room a couple of days

ago when I was on call and we gave her a boost of steroid with a single dose of

Solu-Medrol  mg.  She went home and felt better but yesterday her

symptoms seemed to flare again and she returned.  She is describing some

paresthesias mostly left more than right arm, some weakness, some worsening of

bladder function and some weakness in the lower extremities though she has been

ambulatory.  Her symptoms have already improved today after she started back on

IV Solu-Medrol last evening.

 

The patient is scheduled for an MRI of the lumbar and thoracic spine.  In the

recent admission 10 days ago she had an MRI brain and cervical spine and

results were reviewed.

 

NEUROLOGICAL EXAMINATION

The neurological exam shows the patient to be alert, oriented, mildly anxious

and actually wanting to go home.  Ocular movements and visual fields full.

Pupils equal and reactive.  No facial weakness.  Speech normal.  She is

moderately overweight.  She has reflexes 1+ at the elbows and knees, trace at

the ankles and plantar responses were flexor bilaterally.  She was able to walk

with slight limping in the left lower extremity but she also has osteoarthritic

joint disease involving the knees.  Position sense is normal in the lower

extremities.  There is no sensory level.  Finger-to-nose testing was normal and

her  was well-preserved bilaterally.

 

LABORATORY DATA

Current labs reviewed.  WBC 10.1, hemoglobin 11.8, platelets 214.  Basic

chemistry normal with a BUN 25 today and yesterday was 28.  Creatinine is 0.7

today and yesterday was 1.1.  Random glucose is 134.  B12 level low at 179.

 

ASSESSMENT AND RECOMMENDATIONS

Presumed flare-up of transverse myelitis.  She says that 10 years ago she had a

spinal tap and there was only a single spot in her evaluation, therefore

diagnosis of MS was never made.  She has been having recurrence of symptoms.

The recent MRI brain was more suggestive of microvascular disease than a

primary demyelinating disorder.  She is going to have an MRI of the thoracic

and lumbar spine today.  I agree with continuing with Solu-Medrol 250 mg IV

every 6 hours for a total of 12 doses.  She already has a follow-up appointment

in the office with Dr. Ashley.  Depending on how she progresses, consider lumbar

puncture.  Her B12 level is low and she would benefit from B12 parenteral

replacement.  Methylmalonic acid is pending.

 

I will follow the neurological course if needed while she is in the hospital.

Thank you for asking us to assist in her care.

 

 

 

 

                              _________________________________

                              MD VALERIA Carl/JUDITH

D:  9/14/2017/1:50 PM

T:  9/14/2017/2:08 PM

Visit #:  [id number]

Job #:  [id number]

## 2017-09-15 VITALS
OXYGEN SATURATION: 96 % | HEART RATE: 79 BPM | RESPIRATION RATE: 18 BRPM | SYSTOLIC BLOOD PRESSURE: 110 MMHG | DIASTOLIC BLOOD PRESSURE: 60 MMHG | TEMPERATURE: 98 F

## 2017-09-15 VITALS
RESPIRATION RATE: 20 BRPM | TEMPERATURE: 96.5 F | SYSTOLIC BLOOD PRESSURE: 132 MMHG | DIASTOLIC BLOOD PRESSURE: 73 MMHG | HEART RATE: 76 BPM | OXYGEN SATURATION: 94 %

## 2017-09-15 VITALS
HEART RATE: 70 BPM | TEMPERATURE: 98.1 F | OXYGEN SATURATION: 100 % | SYSTOLIC BLOOD PRESSURE: 145 MMHG | RESPIRATION RATE: 20 BRPM | DIASTOLIC BLOOD PRESSURE: 84 MMHG

## 2017-09-15 VITALS
HEART RATE: 78 BPM | TEMPERATURE: 96.6 F | RESPIRATION RATE: 20 BRPM | SYSTOLIC BLOOD PRESSURE: 123 MMHG | OXYGEN SATURATION: 95 % | DIASTOLIC BLOOD PRESSURE: 72 MMHG

## 2017-09-15 VITALS
SYSTOLIC BLOOD PRESSURE: 118 MMHG | RESPIRATION RATE: 18 BRPM | HEART RATE: 59 BPM | OXYGEN SATURATION: 94 % | TEMPERATURE: 98 F | DIASTOLIC BLOOD PRESSURE: 73 MMHG

## 2017-09-15 LAB — ANA SER QL: (no result)

## 2017-09-15 RX ADMIN — Medication SCH ML: at 08:17

## 2017-09-15 RX ADMIN — PREGABALIN SCH MG: 100 CAPSULE ORAL at 17:57

## 2017-09-15 RX ADMIN — MORPHINE SULFATE PRN MG: 15 TABLET ORAL at 14:01

## 2017-09-15 RX ADMIN — MORPHINE SULFATE PRN MG: 15 TABLET ORAL at 17:58

## 2017-09-15 RX ADMIN — Medication SCH ML: at 21:17

## 2017-09-15 RX ADMIN — PREGABALIN SCH MG: 100 CAPSULE ORAL at 08:13

## 2017-09-15 RX ADMIN — HYDROCODONE BITARTRATE AND ACETAMINOPHEN PRN TAB: 5; 325 TABLET ORAL at 03:40

## 2017-09-15 RX ADMIN — PREGABALIN SCH MG: 100 CAPSULE ORAL at 12:38

## 2017-09-15 RX ADMIN — MORPHINE SULFATE PRN MG: 15 TABLET ORAL at 21:19

## 2017-09-15 RX ADMIN — PANTOPRAZOLE SCH MG: 40 TABLET, DELAYED RELEASE ORAL at 08:13

## 2017-09-15 RX ADMIN — HYDROCODONE BITARTRATE AND ACETAMINOPHEN PRN TAB: 5; 325 TABLET ORAL at 09:56

## 2017-09-15 NOTE — RADRPT
EXAM DATE/TIME:  09/15/2017 15:01 

 

[facility] COMPARISON:     

MRI SACRUM/COCCYX W & W/O CONTRAST, September 14, 2017, 13:45.

       

 

 

INDICATIONS :     

Radiculopathy. Transverse myelitis, bilateral arm and leg numbness and headache.

                     

 

MEDICAL HISTORY :     

Hypertension. Myocardial infarction.   

 

SURGICAL HISTORY :     

Hysterectomy.     Bladder sling.

 

ENCOUNTER:     

Subsequent

 

ACUITY:     

3 day

 

PAIN SCORE:     

7/10

 

LOCATION:       

cranial 

 

TECHNIQUE:     

Multiplanar, multisequence MRI examination of the cervical spine was performed.

 

FINDINGS:     

 

VERTEBRAE:     

Normal vertebral body height.  Homogeneous marrow signal.

 

ALIGNMENT:     

No evidence of subluxation.

 

CORD:     

Normal configuration and signal.

 

POST FOSSA:     

The cerebellar tonsils are normal in position.

 

C2-C3:  

The thecal sac has a normal configuration.  There is no evidence of disc herniation or spinal canal s
tenosis.  The neural foramina are patent bilaterally.

 

C3-C4: 

The thecal space and neural foramina are adequate. Facet joints are intact.

 

C4-C5:  

The thecal sac has a normal configuration.  There is no evidence of disc herniation or spinal canal s
tenosis.  The neural foramina are patent bilaterally.

 

C5-C6: 

There is a small broad-based disc bulge which effaces the ventral thecal sac. There is mild encroachm
ent of disc bulge and osteophytic spur on the lateral recess and foraminal on the left. The foramina 
on the right is adequate.

 

C6-C7: 

There is a small broad-based disc bulge and osteophytic ridging eccentric to the right. There is encr
oachment of osteophytic spur on the lateral recess on the right. The foramina are adequate.

 

C7-T1:  

The thecal sac has a normal configuration.  There is no evidence of disc herniation or spinal canal s
tenosis.  The neural foramina are patent bilaterally.

 

CONCLUSION:     

1. No abnormal signal within the cervical cord to suggest a transverse myelitis identified.

2. Mild degenerative changes as above.

 

 

 

 Johnny Nicole MD on September 15, 2017 at 15:29           

Board Certified Radiologist.

 This report was verified electronically.

## 2017-09-15 NOTE — HHI.PR
Subjective


Remarks


Patient still c/o of headache and some photophobia.


States the nausea is better.


denies fevers or chills


still c/o neck pain and some pain going into her left arm along with 

paresthesias





Objective


Vitals





Vital Signs








  Date Time  Temp Pulse Resp B/P (MAP) Pulse Ox O2 Delivery O2 Flow Rate FiO2


 


9/15/17 12:00 96.5 76 20 132/73 (92) 94   


 


9/15/17 08:30      Room Air  


 


9/15/17 08:00 96.6 78 20 123/72 (89) 95   


 


9/15/17 04:00      Room Air  


 


9/15/17 00:00      Room Air  


 


9/15/17 00:00 98.0 59 18 118/73 (88) 94   


 


9/14/17 20:00 96.6 83 18 118/75 (89) 95   


 


9/14/17 20:00      Room Air  


 


9/14/17 16:00 97.8 69 20 140/73 (95) 96   














I/O      


 


 9/14/17 9/14/17 9/14/17 9/15/17 9/15/17 9/15/17





 07:00 15:00 23:00 07:00 15:00 23:00


 


Intake Total 100 ml   640 ml  


 


Balance 100 ml   640 ml  


 


      


 


Intake Oral 100 ml   640 ml  


 


# Voids    2  


 


# Bowel Movements    0  








Result Diagram:  


9/14/17 0620 9/14/17 0620





Imaging





Last Impressions








Thoracic Spine MRI 9/14/17 0000 Signed





Impressions: 





 Service Date/Time:  Thursday, September 14, 2017 13:45 - CONCLUSION:  1. No 





 evidence of thoracic cord abnormality.  2. Tiny central disc bulge at T5-6.  

3. 





 Minimal diffuse disc bulge at T12-L1.  4. Mild degenerative disc disease at 





 T12-L1.  5. No spinal stenosis, cord compression or nerve root impingement.   

  





 Aj Looney MD 


 


Sacrum/Coccyx MRI 9/14/17 0000 Signed





Impressions: 





 Service Date/Time:  Thursday, September 14, 2017 13:45 - CONCLUSION:  Normal 





 examination. No abnormal areas of bone edema or enhancement are noted.       





 Jared Layne MD 


 


Lumbar Spine MRI 9/14/17 0000 Signed





Impressions: 





 Service Date/Time:  Thursday, September 14, 2017 13:45 - CONCLUSION:  





 Degenerative disc disease at the L4-5 and L5-S1 levels. No significant thecal 





 sac stenosis or abnormal areas of enhancement identified. No evidence of 





 transverse myelitis on this exam.     Jared Layne MD 








Objective Remarks


GENERAL: Well-developed, well-nourished, in no acute distress. alert and 

orientated


HEENT: Head is normocephalic without any lesions or masses noted. Facial 

features are symmetric. Eyes: Pupils equal round reactive to light. Extraocular 

muscles are intact. Conjunctivae were clear. Oropharyngeal: Pharynx without any 

erythema edema. Tongue is midline without deviation. Buccal mucosa is moist 

without any masses or lesions


NECK: Supple without any masses. Trachea midline no deviation. No JVD, no 

bruits are appreciated. There is some tenderness over the cervical spine.


CARDIAC: Regular rhythm, regular rate. S1/S2 are heard. No murmurs gallops or 

rubs.


LUNGS: Clear to auscultation bilaterally. No wheeze, rhonchi or rales. No use 

of accessory muscles on inspiration or expiration.


ABDOMEN: Soft, nontender. Nondistended. Bowel sounds heard in all 4 quadrants. 

No organomegaly or masses. Negative rebound, negative guarding


EXTREMITIES: No edema, pulses are equal bilaterally. No cyanosis or clubbing


NEUROLOGY: Mood and affect appear appropriate. Cranial nerves II through XII 

grossly intact. Muscle strength 5/5 in upper and lower extremities bilaterally. 

Deep tendon reflexes are 2+ in upper and lower extremities bilaterally.


Medications and IVs





Current Medications








 Medications


  (Trade)  Dose


 Ordered  Sig/Beti


 Route  Start Time


 Stop Time Status Last Admin


 


  (NS Flush)  2 ml  UNSCH  PRN


 IV FLUSH  9/13/17 21:30


    9/13/17 23:48


 


 


  (NS Flush)  2 ml  BID


 IV FLUSH  9/14/17 09:00


    9/15/17 08:17


 


 


  (Narcan Inj)  0.4 mg  UNSCH  PRN


 IV PUSH  9/13/17 21:30


     


 


 


  (Protonix)  40 mg  DAILY


 PO  9/14/17 09:00


    9/15/17 08:13


 


 


  (SoluMEDROL INJ)  250 mg  Q6H


 IV PUSH  9/14/17 03:00


 9/16/17 02:59  9/15/17 08:17


 


 


  (Ativan Inj)  0.5 mg  Q4H  PRN


 IV PUSH  9/13/17 23:45


    9/15/17 08:26


 


 


  (Norco  5-325 Mg)  1 tab  Q6H  PRN


 PO  9/14/17 03:15


    9/15/17 09:56


 


 


  (Vitamin B12 Inj)  1,000 mcg  Q3D


 IM  9/14/17 15:00


    9/14/17 15:34


 


 


  (Imitrex Inj)  6 mg  UNSCH  PRN


 SQ  9/14/17 18:15


    9/14/17 22:37


 


 


  (Lyrica)  200 mg  TID


 PO  9/15/17 09:00


    9/15/17 08:13


 











A/P


Problem List:  


(1) Cephalgia


ICD Code:  R51 - Headache


(2) Paresthesia


ICD Code:  R20.2 - Paresthesia of skin


(3) Transverse myelitis


ICD Code:  G37.3 - Acute transverse myelitis in demyelinating disease of 

central nervous system


Status:  Acute


Assessment and Plan


1. Paresthesia, cephalgia with history of transverse myelitis


   Patient with recent hospitalization with completed treatment of IV Solu-

Medrol with recurrent symptoms


   Neurology consulted and following.


   MRI of the brain on 9/2 did not show any acute hemorrhage, mass or 

infarction.  It showed multiple small scattered punctate areas of increased 

signal which are reported as nonspecific and possible small vessel ischemic 

change.  Demyelination is unlikely.


   9/15 appreciate neurology recommendations.  MRI of the thoracic, lumbar and 

sacral spine did not show any evidence of thoracic cord abnormality for the 

exception of some degenerative takes disease as described above.  There is also 

no reported evidence of transverse myelitis on this exam.  B12 level is low at 

179.  Continue vitamin B12 injections 1000 g every 3 days intramuscular.  

Continue IV Solu-Medrol for now.  We'll order a cervical spine MRI since last 

study was suboptimal due to motion artifact.  For pain control I will start the 

patient on morphine immediate release.  





3. Hypertension


   The blood pressure seems to be stable without BP meds.  I will restart the 

patient on her home lisinopril 10 minutes by mouth daily.  Patient is also on 

Cardizem which I will hold since blood pressures by well controlled.





2. Headache/photophobia


   No fevers or nuchal rigidity on exam.  A trial of sumatriptan was given 

yesterday due to suspected possible complicated migraine headache, however the 

patient did not get any relief from the sumatriptan subcutaneous injections.  I 

will start the patient on oral morphine immediate release for pain control.





DVT prevention


   Low risk, early ambulation











Dickson Song,David MD Sep 15, 2017 12:59

## 2017-09-16 VITALS
HEART RATE: 82 BPM | TEMPERATURE: 97.5 F | SYSTOLIC BLOOD PRESSURE: 108 MMHG | RESPIRATION RATE: 19 BRPM | DIASTOLIC BLOOD PRESSURE: 68 MMHG | OXYGEN SATURATION: 94 %

## 2017-09-16 VITALS
HEART RATE: 80 BPM | DIASTOLIC BLOOD PRESSURE: 67 MMHG | OXYGEN SATURATION: 93 % | SYSTOLIC BLOOD PRESSURE: 112 MMHG | RESPIRATION RATE: 21 BRPM | TEMPERATURE: 97.7 F

## 2017-09-16 VITALS
RESPIRATION RATE: 16 BRPM | OXYGEN SATURATION: 94 % | HEART RATE: 83 BPM | DIASTOLIC BLOOD PRESSURE: 58 MMHG | TEMPERATURE: 97.9 F | SYSTOLIC BLOOD PRESSURE: 98 MMHG

## 2017-09-16 VITALS — RESPIRATION RATE: 18 BRPM

## 2017-09-16 RX ADMIN — Medication SCH ML: at 08:33

## 2017-09-16 RX ADMIN — PREGABALIN SCH MG: 100 CAPSULE ORAL at 08:31

## 2017-09-16 RX ADMIN — MORPHINE SULFATE PRN MG: 15 TABLET ORAL at 03:45

## 2017-09-16 RX ADMIN — MORPHINE SULFATE PRN MG: 15 TABLET ORAL at 10:02

## 2017-09-16 RX ADMIN — PREGABALIN SCH MG: 100 CAPSULE ORAL at 12:56

## 2017-09-16 RX ADMIN — MORPHINE SULFATE PRN MG: 15 TABLET ORAL at 14:47

## 2017-09-16 RX ADMIN — PANTOPRAZOLE SCH MG: 40 TABLET, DELAYED RELEASE ORAL at 08:32

## 2017-09-16 NOTE — HHI.DCPOC
Discharge Care Plan


Diagnosis:  


(1) B12 deficiency


(2) Cephalgia


(3) Neurological complaint


(4) Hypertension


(5) Paresthesia


Goals to Promote Your Health


* To prevent worsening of your condition and complications


* To maintain your health at the optimal level


Directions to Meet Your Goals


*** Take your medications as prescribed


*** Follow your dietary instruction


*** Follow activity as directed








*** Keep your appointments as scheduled


*** Take your immunizations and boosters as scheduled


*** If your symptoms worsen call your PCP, if no PCP go to Urgent Care Center 

or Emergency Room***


*** Smoking is Dangerous to Your Health. Avoid second hand smoke***


***Call the 24-hour hour crisis hotline for domestic abuse at 1-397.444.6893***











David Davies MD Sep 16, 2017 16:15

## 2018-05-31 ENCOUNTER — HOSPITAL ENCOUNTER (OUTPATIENT)
Dept: HOSPITAL 17 - HROP | Age: 48
Discharge: HOME | End: 2018-05-31
Attending: PSYCHIATRY & NEUROLOGY
Payer: COMMERCIAL

## 2018-05-31 VITALS
SYSTOLIC BLOOD PRESSURE: 127 MMHG | DIASTOLIC BLOOD PRESSURE: 79 MMHG | RESPIRATION RATE: 20 BRPM | HEART RATE: 68 BPM | OXYGEN SATURATION: 99 %

## 2018-05-31 VITALS
DIASTOLIC BLOOD PRESSURE: 83 MMHG | HEART RATE: 72 BPM | TEMPERATURE: 97.8 F | SYSTOLIC BLOOD PRESSURE: 142 MMHG | OXYGEN SATURATION: 93 % | RESPIRATION RATE: 20 BRPM

## 2018-05-31 VITALS
OXYGEN SATURATION: 96 % | SYSTOLIC BLOOD PRESSURE: 141 MMHG | DIASTOLIC BLOOD PRESSURE: 87 MMHG | RESPIRATION RATE: 18 BRPM | TEMPERATURE: 97.7 F | HEART RATE: 65 BPM

## 2018-05-31 DIAGNOSIS — I25.2: ICD-10-CM

## 2018-05-31 DIAGNOSIS — G37.3: Primary | ICD-10-CM

## 2018-05-31 DIAGNOSIS — I10: ICD-10-CM

## 2018-05-31 DIAGNOSIS — G62.9: ICD-10-CM

## 2018-05-31 LAB
BASOPHILS # BLD AUTO: 0.1 TH/MM3 (ref 0–0.2)
BASOPHILS NFR BLD: 1.2 % (ref 0–2)
BUN SERPL-MCNC: 14 MG/DL (ref 7–18)
CALCIUM SERPL-MCNC: 8.2 MG/DL (ref 8.5–10.1)
CHLORIDE SERPL-SCNC: 107 MEQ/L (ref 98–107)
COLOR CSF: CLEAR
CREAT SERPL-MCNC: 0.68 MG/DL (ref 0.5–1)
EOSINOPHIL # BLD: 0.2 TH/MM3 (ref 0–0.4)
EOSINOPHIL NFR BLD: 3.2 % (ref 0–4)
ERYTHROCYTE [DISTWIDTH] IN BLOOD BY AUTOMATED COUNT: 16 % (ref 11.6–17.2)
GFR SERPLBLD BASED ON 1.73 SQ M-ARVRAT: 92 ML/MIN (ref 89–?)
GLUCOSE SERPL-MCNC: 84 MG/DL (ref 74–106)
HCO3 BLD-SCNC: 24.4 MEQ/L (ref 21–32)
HCT VFR BLD CALC: 30.1 % (ref 35–46)
HGB BLD-MCNC: 10.1 GM/DL (ref 11.6–15.3)
INR PPP: 1 RATIO
LYMPHOCYTES # BLD AUTO: 1.3 TH/MM3 (ref 1–4.8)
LYMPHOCYTES NFR BLD AUTO: 19.9 % (ref 9–44)
LYMPHOCYTES NFR CSF MANUAL: 20 %
MCH RBC QN AUTO: 32.3 PG (ref 27–34)
MCHC RBC AUTO-ENTMCNC: 33.5 % (ref 32–36)
MCV RBC AUTO: 96.4 FL (ref 80–100)
MONOCYTE #: 0.6 TH/MM3 (ref 0–0.9)
MONOCYTES NFR BLD: 8.3 % (ref 0–8)
NEUTROPHILS # BLD AUTO: 4.6 TH/MM3 (ref 1.8–7.7)
NEUTROPHILS NFR BLD AUTO: 67.4 % (ref 16–70)
NEUTROPHILS NFR CSF: 80 %
PLATELET # BLD: 145 TH/MM3 (ref 150–450)
PMV BLD AUTO: 10.1 FL (ref 7–11)
PROTHROMBIN TIME: 9.7 SEC (ref 9.8–11.6)
RBC # BLD AUTO: 3.12 MIL/MM3 (ref 4–5.3)
RBC TUBE #4: 590 /MM3
SODIUM SERPL-SCNC: 142 MEQ/L (ref 136–145)
SPECIMEN VOL CSF: 2 ML
TOTAL PROTEIN,CSF: 40.2 MG/DL (ref 15–45)
WBC # BLD AUTO: 6.8 TH/MM3 (ref 4–11)
WBC TUBE4 # CSF: 2 /MM3 (ref 0–10)

## 2018-05-31 PROCEDURE — 87476 LYME DIS DNA AMP PROBE: CPT

## 2018-05-31 PROCEDURE — 77003 FLUOROGUIDE FOR SPINE INJECT: CPT

## 2018-05-31 PROCEDURE — 85730 THROMBOPLASTIN TIME PARTIAL: CPT

## 2018-05-31 PROCEDURE — 62270 DX LMBR SPI PNXR: CPT

## 2018-05-31 PROCEDURE — 80048 BASIC METABOLIC PNL TOTAL CA: CPT

## 2018-05-31 PROCEDURE — 86592 SYPHILIS TEST NON-TREP QUAL: CPT

## 2018-05-31 PROCEDURE — 85610 PROTHROMBIN TIME: CPT

## 2018-05-31 PROCEDURE — 82945 GLUCOSE OTHER FLUID: CPT

## 2018-05-31 PROCEDURE — 87205 SMEAR GRAM STAIN: CPT

## 2018-05-31 PROCEDURE — 89051 BODY FLUID CELL COUNT: CPT

## 2018-05-31 PROCEDURE — 84157 ASSAY OF PROTEIN OTHER: CPT

## 2018-05-31 PROCEDURE — 87070 CULTURE OTHR SPECIMN AEROBIC: CPT

## 2018-05-31 PROCEDURE — 85025 COMPLETE CBC W/AUTO DIFF WBC: CPT

## 2018-05-31 NOTE — PD.RAD
Post Procedure Progress Note


Procedure Date:


May 31, 2018


Supervising Radiologist:


Jason West


Proceduralist/Assist:  RT Duran(R), Other


Anesthesia:  Local


Plan of Activity


Patient to Unit:  [facility]


Patient Condition:  Good


See PACS Report for procedural detail/treatment











Jason West MD May 31, 2018 09:41

## 2018-06-03 ENCOUNTER — HOSPITAL ENCOUNTER (EMERGENCY)
Dept: HOSPITAL 17 - PHED | Age: 48
Discharge: HOME | End: 2018-06-03
Payer: COMMERCIAL

## 2018-06-03 VITALS
HEART RATE: 65 BPM | RESPIRATION RATE: 18 BRPM | OXYGEN SATURATION: 94 % | DIASTOLIC BLOOD PRESSURE: 65 MMHG | SYSTOLIC BLOOD PRESSURE: 121 MMHG

## 2018-06-03 VITALS
DIASTOLIC BLOOD PRESSURE: 68 MMHG | RESPIRATION RATE: 18 BRPM | HEART RATE: 73 BPM | TEMPERATURE: 97.9 F | OXYGEN SATURATION: 95 % | SYSTOLIC BLOOD PRESSURE: 137 MMHG

## 2018-06-03 VITALS — OXYGEN SATURATION: 97 %

## 2018-06-03 VITALS — HEIGHT: 63 IN | WEIGHT: 244.71 LBS | BODY MASS INDEX: 43.36 KG/M2

## 2018-06-03 DIAGNOSIS — F39: ICD-10-CM

## 2018-06-03 DIAGNOSIS — I10: ICD-10-CM

## 2018-06-03 DIAGNOSIS — F41.9: ICD-10-CM

## 2018-06-03 DIAGNOSIS — F17.200: ICD-10-CM

## 2018-06-03 DIAGNOSIS — M17.12: ICD-10-CM

## 2018-06-03 DIAGNOSIS — F32.9: ICD-10-CM

## 2018-06-03 DIAGNOSIS — R51: Primary | ICD-10-CM

## 2018-06-03 DIAGNOSIS — G37.3: ICD-10-CM

## 2018-06-03 DIAGNOSIS — I25.2: ICD-10-CM

## 2018-06-03 PROCEDURE — 70450 CT HEAD/BRAIN W/O DYE: CPT

## 2018-06-03 PROCEDURE — 99284 EMERGENCY DEPT VISIT MOD MDM: CPT

## 2018-06-03 PROCEDURE — 96374 THER/PROPH/DIAG INJ IV PUSH: CPT

## 2018-06-03 PROCEDURE — 96376 TX/PRO/DX INJ SAME DRUG ADON: CPT

## 2018-06-03 NOTE — PD
HPI


Chief Complaint:  Headache


Time Seen by Provider:  15:33


Travel History


International Travel<30 days:  No


Contact w/Intl Traveler<30days:  No


Traveled to known affect area:  No





History of Present Illness


HPI


had outpatient LP to rule out MS, and since she has had this headache.











Allergy to Tylenol and oxycodone


Past medical history significant for transverse myelitis spinal cord lesion, MI

, syncope, migraine, cardiac catheterization hypertension,





PFSH


Past Medical History


Hx Anticoagulant Therapy:  No


Arthritis:  Yes (left knee)


Autoimmune Disease:  No


Anxiety:  Yes


Depression:  Yes


Heart Rhythm Problems:  No


Cancer:  No


Cardiac Catheterization:  Yes


Cardiovascular Problems:  Yes (MI )


Chest Pain:  No


Congestive Heart Failure:  No


Cerebrovascular Accident:  No


Diabetes:  No


Diminished Hearing:  No


Endocrine:  No


GERD:  No


Genitourinary:  No


Hiatal Hernia:  No


Hypertension:  Yes


Immune Disorder:  No


Neurologic:  Yes (Transverse myelitis, spinal cord lesion )


Psychiatric:  Yes (Mood disorder )


Reproductive:  No


Respiratory:  No


Immunizations Current:  Yes


Migraines:  Yes


Myocardial Infarction:  Yes


Seizures:  No


Ulcer:  No


Influenza Vaccination:  No


Pregnant?:  Not Pregnant


Menopausal:  Yes


Tubal Ligation:  Yes





Past Surgical History


Abdominal Surgery:  Yes (hysterectomy)


Body Medical Devices:  bladder sling


Cardiac Surgery:  No


Ear Surgery:  No


Endocrine Surgery:  No


Eye Surgery:  No


Genitourinary Surgery:  Yes (Bladder sling)


Hysterectomy:  Yes


Oral Surgery:  No


Thoracic Surgery:  No


Other Surgery:  Yes (LUMBAR PUNCTURE 5/31/18)





Social History


Alcohol Use:  No


Tobacco Use:  Yes (1 PPD)


Substance Use:  No





Allergies-Medications


(Allergen,Severity, Reaction):  


Coded Allergies:  


     acetaminophen (Verified  Allergy, Intermediate, Nausea/Vomiting, 6/3/18)


     oxycodone (Verified  Allergy, Intermediate, Nausea/Vomiting, 6/3/18)


Reported Meds & Prescriptions





Reported Meds & Active Scripts


Active


Vitamin B-12 ER (Cyanocobalamin) 1,000 Mcg Tab 1,000 Mcg PO DAILY


Reported


Iron (Ferrous Sulfate) 18 Mg Tab Unknown Dose PO DAILY


Duloxetine DR (Duloxetine HCl) 20 Mg Capdr 20 Mg PO BID


Docusate Sodium 100 Mg Cap 100 Mg PO BID


Lyrica (Pregabalin) 200 Mg Cap 200 Mg PO TID


Omeprazole 20 Mg Tab 40 Mg PO BID


Lisinopril 10 Mg Tab 40 Mg PO BID


Cardizem CD 24 HR (Diltiazem CD 24 HR) 240 Mg Caper 240 Mg PO DAILY








Physical Exam


Narrative


GENERAL: 


SKIN: Warm and dry.


HEAD: Atraumatic. Normocephalic. 


EYES: Pupils equal and round. No scleral icterus. No injection or drainage. 


ENT: No nasal bleeding or discharge.  Mucous membranes pink and moist.


NECK: Trachea midline. No JVD. 


CARDIOVASCULAR: Regular rate and rhythm.  


RESPIRATORY: No accessory muscle use. Clear to auscultation. Breath sounds 

equal bilaterally. 


GASTROINTESTINAL: Abdomen soft, non-tender, nondistended. 


MUSCULOSKELETAL: Extremities without clubbing, cyanosis, or edema. No obvious 

deformities. 


NEUROLOGICAL: Awake and alert. No obvious cranial nerve deficits.  Motor 

grossly within normal limits. Five out of 5 muscle strength in the arms and 

legs.  Normal speech.


PSYCHIATRIC: Appropriate mood and affect; insight and judgment normal.





Data


Data


Last Documented VS





Vital Signs








  Date Time  Temp Pulse Resp B/P (MAP) Pulse Ox O2 Delivery O2 Flow Rate FiO2


 


6/3/18 15:30     97 Room Air  


 


6/3/18 15:20 97.9 73 18 137/68 (91)    








Orders





 Orders


Ct Brain W/O Iv Contrast(Rout) (6/3/18 15:45)


Ecg Monitoring (6/3/18 15:45)


Iv Access Insert/Monitor (6/3/18 15:45)


Oximetry (6/3/18 15:45)


Ondansetron  Odt (Zofran  Odt) (6/3/18 15:45)


Sodium Chlor 0.9% 1000 Ml Inj (Ns 1000 M (6/3/18 15:45)


Morphine Inj (Morphine Inj) (6/3/18 15:45)


Invasive Rad Dept Consult (6/3/18 )


Morphine Inj (Morphine Inj) (6/3/18 17:45)








MDM


Medical Decision Making


Medical Screen Exam Complete:  Yes


Emergency Medical Condition:  Yes


Medical Record Reviewed:  Yes


Differential Diagnosis


Cephalgia versus CSF leak versus post-LP headache


Narrative Course


Patient is neurologically intact and moving all extremities well, feels better 

after medication and fluids.  This case was advised discussed at length with 

radiologist who recommended that if pain still rate remains to contact phone 

number and she will be scheduled for a blood patch to be placed by 

anesthesiology.  Patient understood instructions


CT head neg for major csf leak as interpreted by radiologist





Diagnosis





 Primary Impression:  


 Cephalgia


 Qualified Codes:  R51 - Headache


Patient Instructions:  Acute Headache (ED), General Instructions


Disposition:  01 DISCHARGE HOME


Condition:  Stable











Alexander Muñoz MD Nikolas 3, 2018 15:37

## 2018-06-03 NOTE — RADRPT
EXAM DATE:  6/3/2018 4:40 PM EDT

AGE/SEX:        48 years / Female



INDICATIONS:  Posterior head pain. Status  post lumbar puncture four days ago.



CLINICAL DATA:  This is the patient's initial encounter. Patient reports that signs and symptoms have
 been present for 4 - 6 days and indicates a pain score of 3/10. 

                                                                          

MEDICAL/SURGICAL HISTORY:   . Myocardial infarct. Transverse myelitis. Spinal cord lesion.  Hysterect
jet. Bladder sling.



RADIATION DOSE:  56.93 CTDI (mGy)







COMPARISON:      No prior [facility] exams available for comparison.





TECHNIQUE:  CT of the head without contrast.  Using automated exposure control and adjustment of the 
mA and/or kV according to patient size, radiation dose was kept as low as reasonably achievable to ob
tain optimal diagnostic quality images.



FINDINGS:

Cerebrum:  The ventricles are normal for age.  No evidence of midline shift, mass lesion, hemorrhage 
or acute infarction.  No extraaxial fluid collections are seen.

Posterior Fossa:  The cerebellum and brainstem are intact.  The 4th ventricle is midline.  The cerebe
llopontine angle is unremarkable.

Extracranial:  The visualized portion of the orbits is intact.

Skull:  The calvaria is intact.  No evidence of skull fracture.







CONCLUSION: Negative noncontrast head CT. No bleed or overt evidence of intracranial hypotension. How
ever, in the setting of headache after recent lumbar puncture, a small CSF leak is still in the diffe
rential. A blood patch can be scheduled with special procedures on an outpatient basis if clinical co
ncern continues.



Electronically signed by: Misha Beck MD  6/3/2018 4:48 PM EDT

## 2018-06-04 ENCOUNTER — HOSPITAL ENCOUNTER (EMERGENCY)
Dept: HOSPITAL 17 - HOR | Age: 48
Discharge: HOME | End: 2018-06-04
Payer: COMMERCIAL

## 2018-06-04 VITALS
RESPIRATION RATE: 20 BRPM | HEART RATE: 70 BPM | SYSTOLIC BLOOD PRESSURE: 156 MMHG | DIASTOLIC BLOOD PRESSURE: 86 MMHG | OXYGEN SATURATION: 95 % | TEMPERATURE: 97.1 F

## 2018-06-04 VITALS
HEART RATE: 76 BPM | RESPIRATION RATE: 18 BRPM | OXYGEN SATURATION: 97 % | SYSTOLIC BLOOD PRESSURE: 111 MMHG | DIASTOLIC BLOOD PRESSURE: 68 MMHG

## 2018-06-04 VITALS — WEIGHT: 220.46 LBS | BODY MASS INDEX: 39.06 KG/M2 | HEIGHT: 63 IN

## 2018-06-04 VITALS
HEART RATE: 84 BPM | TEMPERATURE: 98.1 F | RESPIRATION RATE: 16 BRPM | DIASTOLIC BLOOD PRESSURE: 70 MMHG | SYSTOLIC BLOOD PRESSURE: 145 MMHG | OXYGEN SATURATION: 92 %

## 2018-06-04 DIAGNOSIS — Z88.5: ICD-10-CM

## 2018-06-04 DIAGNOSIS — F17.210: ICD-10-CM

## 2018-06-04 DIAGNOSIS — I10: ICD-10-CM
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
## 2018-06-04 DIAGNOSIS — G97.1: Primary | ICD-10-CM

## 2018-06-04 PROCEDURE — 62273 INJECT EPIDURAL PATCH: CPT

## 2018-06-04 PROCEDURE — 99284 EMERGENCY DEPT VISIT MOD MDM: CPT

## 2018-06-04 PROCEDURE — 96375 TX/PRO/DX INJ NEW DRUG ADDON: CPT

## 2018-06-04 PROCEDURE — 96374 THER/PROPH/DIAG INJ IV PUSH: CPT

## 2018-06-04 NOTE — PD
HPI


Chief Complaint:  Headache


Time Seen by Provider:  05:46


Travel History


International Travel<30 days:  No


Contact w/Intl Traveler<30days:  No


Traveled to known affect area:  No





History of Present Illness


HPI


47yo F presents to the ED concerning a headache x5days. Pt states she had an LP 

performed on Thursday to diagnose possible multiple sclerosis. Pt states that 

she started have a headache following the LP, that is localized to the back of 

her head, and described as a pulling sensation. She states the headache is 

aggravated by sitting up or changing position too rapidly. She also states that 

starting two days ago, she started to experience nausea that started as 

intermittent and has now progressed to being constant. She denies any vomiting. 

Pt was seen at [facility] ED yesterday, and was told that if symptoms 

worsened or continued she should report to [facility] ED for a blood patch. 

Pt denies any fevers, blurry vision, neck pain or stiffness, chest pain, SOB, 

or new focal neurologic deficits.





PFSH


Past Medical History


Hx Anticoagulant Therapy:  No


Arthritis:  Yes (left knee)


Autoimmune Disease:  No


Anxiety:  Yes


Depression:  Yes


Heart Rhythm Problems:  No


Cancer:  No


Cardiac Catheterization:  Yes


Cardiovascular Problems:  Yes (HTN, MI)


Chest Pain:  No


Congestive Heart Failure:  No


Cerebrovascular Accident:  No


Diabetes:  No


Diminished Hearing:  No


Endocrine:  No


GERD:  No


Genitourinary:  No


Hiatal Hernia:  No


Hypertension:  Yes


Immune Disorder:  No


Neurologic:  Yes (Transverse myelitis, spinal cord lesion )


Psychiatric:  Yes (Mood disorder )


Reproductive:  No


Respiratory:  No


Immunizations Current:  Yes


Migraines:  Yes


Myocardial Infarction:  Yes


Seizures:  No


Ulcer:  No


Pregnant?:  Not Pregnant


Menopausal:  Yes


Tubal Ligation:  Yes





Past Surgical History


Abdominal Surgery:  Yes (hysterectomy)


Body Medical Devices:  bladder sling


Cardiac Surgery:  No


Ear Surgery:  No


Endocrine Surgery:  No


Eye Surgery:  No


Genitourinary Surgery:  Yes (Bladder sling)


Hysterectomy:  Yes


Oral Surgery:  No


Thoracic Surgery:  No


Other Surgery:  Yes (LUMBAR PUNCTURE 5/31/18)





Social History


Alcohol Use:  No


Tobacco Use:  Yes (1 PPD)


Substance Use:  No





Allergies-Medications


(Allergen,Severity, Reaction):  


Coded Allergies:  


     acetaminophen (Verified  Allergy, Intermediate, Nausea/Vomiting, 6/3/18)


     oxycodone (Verified  Allergy, Intermediate, Nausea/Vomiting, 6/3/18)


Reported Meds & Prescriptions





Reported Meds & Active Scripts


Active


Vitamin B-12 ER (Cyanocobalamin) 1,000 Mcg Tab 1,000 Mcg PO DAILY


Reported


Iron (Ferrous Sulfate) 18 Mg Tab Unknown Dose PO DAILY


Duloxetine DR (Duloxetine HCl) 20 Mg Capdr 20 Mg PO BID


Docusate Sodium 100 Mg Cap 100 Mg PO BID


Lyrica (Pregabalin) 200 Mg Cap 200 Mg PO TID


Omeprazole 20 Mg Tab 40 Mg PO BID


Lisinopril 10 Mg Tab 40 Mg PO BID


Cardizem CD 24 HR (Diltiazem CD 24 HR) 240 Mg Caper 240 Mg PO DAILY








Physical Exam


Narrative


GENERAL: Well developed and well nourished female in acute distress. Alert and 

oriented x3. 


SKIN: Warm and dry.


HEAD: Atraumatic. Normocephalic. 


EYES: Pupils equal and round. No scleral icterus. No injection or drainage. 

Photophobia. 


CARDIOVASCULAR: Regular rate and rhythm.  No gallops, rubs or murmurs. 


RESPIRATORY: No accessory muscle use. Clear to auscultation. Breath sounds 

equal bilaterally. 


GASTROINTESTINAL: Abdomen soft, non-tender, nondistended. Hepatic and splenic 

margins not palpable. 


MUSCULOSKELETAL: Extremities without clubbing, cyanosis, or edema. No obvious 

deformities. 


NEUROLOGICAL: Awake and alert. No obvious cranial nerve deficits.  Motor 

grossly within normal limits. Decreased muscle strength in upper extremities 

and RLE. Decreased sensation in LUE.  Normal speech.


PSYCHIATRIC: Appropriate mood and affect; insight and judgment normal.





Data


Data


Last Documented VS





Vital Signs








  Date Time  Temp Pulse Resp B/P (MAP) Pulse Ox O2 Delivery O2 Flow Rate FiO2


 


6/4/18 05:04 97.1 70 20 156/86 (109) 95   








Orders





 Orders


Sodium Chlor 0.9% 1000 Ml Inj (Ns 1000 M (6/4/18 06:00)


Prochlorperazine Inj (Compazine Inj) (6/4/18 06:00)


Morphine Inj (Morphine Inj) (6/4/18 06:00)


Anesthesia Consult (6/4/18 )








[facility]


Medical Decision Making


Medical Screen Exam Complete:  Yes


Emergency Medical Condition:  Yes


Differential Diagnosis


Migraine versus post LP headache versus hypertensive headache


Narrative Course


48-year-old female status post lumbar puncture to workup for possible multiple 

sclerosis on 5/31/2018.  Patient has had passive postural headaches since then.

  She was seen down at [place] emergency department yesterday and was told 

that the headache persisted she should come here for a blood patch.  The 

patient is here stating the headache has not gotten better and is.  She has 

been given 1 L of IV fluid and 10 mg of Compazine and 2 mg of morphine.  She 

will have her blood patch performed by the anesthesiologist.  I discussed the 

case with the on-call overnight anesthesiologist and he will arrange for the 7 

AM anesthesiologist to perform the procedure.  I anticipate the patient will be 

discharged after the procedure.  She will be evaluated by the oncoming 

physician and if she is comfortable, she can safely be discharged with follow-

up with her primary care physician.





Diagnosis





 Primary Impression:  


 Post LP headache


 Additional Impression:  


 History of hypertension











Oral Pinedo MD Jun 4, 2018 05:57

## 2018-06-19 ENCOUNTER — HOSPITAL ENCOUNTER (EMERGENCY)
Dept: HOSPITAL 17 - PHED | Age: 48
Discharge: HOME | End: 2018-06-19
Payer: COMMERCIAL

## 2018-06-19 VITALS
OXYGEN SATURATION: 95 % | RESPIRATION RATE: 18 BRPM | SYSTOLIC BLOOD PRESSURE: 126 MMHG | HEART RATE: 92 BPM | DIASTOLIC BLOOD PRESSURE: 56 MMHG | TEMPERATURE: 97.4 F

## 2018-06-19 VITALS — BODY MASS INDEX: 43.75 KG/M2 | HEIGHT: 63 IN | WEIGHT: 246.92 LBS

## 2018-06-19 VITALS
RESPIRATION RATE: 16 BRPM | DIASTOLIC BLOOD PRESSURE: 60 MMHG | OXYGEN SATURATION: 96 % | HEART RATE: 90 BPM | SYSTOLIC BLOOD PRESSURE: 120 MMHG

## 2018-06-19 VITALS — DIASTOLIC BLOOD PRESSURE: 77 MMHG | SYSTOLIC BLOOD PRESSURE: 118 MMHG

## 2018-06-19 DIAGNOSIS — M79.89: Primary | ICD-10-CM

## 2018-06-19 DIAGNOSIS — F32.9: ICD-10-CM

## 2018-06-19 DIAGNOSIS — I25.2: ICD-10-CM

## 2018-06-19 DIAGNOSIS — G62.9: ICD-10-CM

## 2018-06-19 DIAGNOSIS — Z90.710: ICD-10-CM

## 2018-06-19 DIAGNOSIS — F17.210: ICD-10-CM

## 2018-06-19 DIAGNOSIS — I10: ICD-10-CM

## 2018-06-19 PROCEDURE — 99281 EMR DPT VST MAYX REQ PHY/QHP: CPT

## 2018-06-19 NOTE — PD
HPI


Chief Complaint:  Edema


Time Seen by Provider:  23:07


Travel History


International Travel<30 days:  No


Contact w/Intl Traveler<30days:  No


Traveled to known affect area:  No





History of Present Illness


HPI


The patient is a 48-year-old female that complains of burning in her legs and 

swelling in her leg since yesterday.  The patient already has a history of 

transverse myelitis and is on Lyrica for apparent peripheral neuropathy.  She 

does not have a history of congestive heart failure.  She is due to see Dr. Ashley on the 25th of this month to follow-up her transverse myelitis.  She is 

not on steroids at this time and has been off steroids for months.  She denies 

any fever.





PFSH


Past Medical History


Hx Anticoagulant Therapy:  No


Arthritis:  Yes (left knee)


Autoimmune Disease:  No


Anxiety:  Yes


Depression:  Yes


Heart Rhythm Problems:  No


Cancer:  No


Cardiac Catheterization:  Yes


Cardiovascular Problems:  Yes (HTN, MI)


Chest Pain:  No


Congestive Heart Failure:  No


Cerebrovascular Accident:  No


Diabetes:  No


Diminished Hearing:  No


Endocrine:  No


GERD:  No


Genitourinary:  No


Hiatal Hernia:  No


Hypertension:  Yes


Immune Disorder:  No


Neurologic:  Yes (Transverse myelitis, spinal cord lesion )


Psychiatric:  Yes (Mood disorder )


Reproductive:  No


Respiratory:  No


Immunizations Current:  Yes


Migraines:  Yes


Myocardial Infarction:  Yes


Seizures:  No


Ulcer:  No


Tetanus Vaccination:  Unknown


Influenza Vaccination:  No


Pregnant?:  Not Pregnant


Menopausal:  Yes


Tubal Ligation:  Yes





Past Surgical History


Abdominal Surgery:  Yes (hysterectomy)


Body Medical Devices:  bladder sling


Cardiac Surgery:  No


Ear Surgery:  No


Endocrine Surgery:  No


Eye Surgery:  No


Genitourinary Surgery:  Yes (Bladder sling)


Hysterectomy:  Yes


Oral Surgery:  No


Thoracic Surgery:  No


Other Surgery:  Yes (LUMBAR PUNCTURE 5/31/18)





Social History


Alcohol Use:  No


Tobacco Use:  Yes (1 PPD)


Substance Use:  No





Allergies-Medications


(Allergen,Severity, Reaction):  


Coded Allergies:  


     acetaminophen (Verified  Allergy, Intermediate, Nausea/Vomiting, 6/19/18)


     oxycodone (Verified  Allergy, Intermediate, Nausea/Vomiting, 6/19/18)


Reported Meds & Prescriptions





Reported Meds & Active Scripts


Active


Vitamin B-12 ER (Cyanocobalamin) 1,000 Mcg Tab 1,000 Mcg PO DAILY


Reported


Iron (Ferrous Sulfate) 18 Mg Tab Unknown Dose PO DAILY


Duloxetine DR (Duloxetine HCl) 20 Mg Capdr 20 Mg PO BID


Docusate Sodium 100 Mg Cap 100 Mg PO BID


Lyrica (Pregabalin) 200 Mg Cap 200 Mg PO TID


Omeprazole 20 Mg Tab 40 Mg PO BID


Lisinopril 10 Mg Tab 40 Mg PO BID


Cardizem CD 24 HR (Diltiazem CD 24 HR) 240 Mg Caper 240 Mg PO DAILY








Review of Systems


Except as stated in HPI:  all other systems reviewed are Neg





Physical Exam


Narrative


GENERAL: Well-nourished, alert and oriented, obese patient.


SKIN: Focused skin assessment warm/dry.  There is no redness or evidence of 

cellulitis on her legs.  They do not feel hot.


HEAD: Normocephalic.


EYES: No scleral icterus. No injection or drainage. 


NECK: Supple, trachea midline. No JVD or lymphadenopathy.


CARDIOVASCULAR: Regular rate and rhythm without murmurs, gallops, or rubs. 


RESPIRATORY: Breath sounds equal bilaterally. No accessory muscle use.


GASTROINTESTINAL: Abdomen soft, non-tender, nondistended.


MUSCULOSKELETAL: No cyanosis, there is no real edema, no pitting edema.  The 

patient can perceive swelling in her legs and ankles.  Homans sign is negative 

and there is no cord palpated in the cast.  There is no redness and there is no 

heat over the skin of the lower legs.


BACK: Nontender without obvious deformity. No CVA tenderness.





Data


Data


Last Documented VS





Vital Signs








  Date Time  Temp Pulse Resp B/P (MAP) Pulse Ox O2 Delivery O2 Flow Rate FiO2


 


6/19/18 22:32  90 16 120/60 (80) 96 Room Air  


 


6/19/18 20:40 97.4       











MDM


Medical Decision Making


Medical Screen Exam Complete:  Yes


Emergency Medical Condition:  Yes


Medical Record Reviewed:  Yes


Differential Diagnosis


Cellulitis, congestive heart failure, peripheral neuropathy, peripheral venous 

obstruction


Narrative Course


The patient appears to have mild swelling in her legs that is been going on for 

1 day.  There is no pitting edema and there is no evidence of cellulitis.  The 

patient simply has leg swelling and the pain is likely the same pain that she 

has had before with her apparent peripheral neuropathy.





Plan: The patient can take the Lasix that she already has.  She can increase 

this to 20 mg twice daily instead of once daily.  She should elevate the legs 

above her heart.  She should follow-up with Dr. Ashley as scheduled or perhaps 

earlier.





Diagnosis





 Primary Impression:  


 Localized swelling of both lower legs


 Additional Impression:  


 Peripheral neuropathy





***Additional Instructions:  


Follow-up with Dr. Ashley as soon as possible.  Elevate the legs above your heart

, this is the only way that the swelling will go down.  As we discussed, you 

can increase the Lasix to 20 mg twice daily.  Also take the potassium twice 

daily when you do this.


***Med/Other Pt SpecificInfo:  No Change to Meds


Disposition:  01 DISCHARGE HOME


Condition:  Dimitri Black MD Jun 19, 2018 23:18